# Patient Record
Sex: FEMALE | Race: WHITE | NOT HISPANIC OR LATINO | Employment: UNEMPLOYED | URBAN - METROPOLITAN AREA
[De-identification: names, ages, dates, MRNs, and addresses within clinical notes are randomized per-mention and may not be internally consistent; named-entity substitution may affect disease eponyms.]

---

## 2017-01-09 ENCOUNTER — COMMUNICATION - HEALTHEAST (OUTPATIENT)
Dept: NURSING | Facility: CLINIC | Age: 52
End: 2017-01-09

## 2017-01-16 ENCOUNTER — COMMUNICATION - HEALTHEAST (OUTPATIENT)
Dept: INTERNAL MEDICINE | Facility: CLINIC | Age: 52
End: 2017-01-16

## 2017-02-22 ENCOUNTER — COMMUNICATION - HEALTHEAST (OUTPATIENT)
Dept: NURSING | Facility: CLINIC | Age: 52
End: 2017-02-22

## 2017-03-28 ENCOUNTER — TRANSFERRED RECORDS (OUTPATIENT)
Dept: HEALTH INFORMATION MANAGEMENT | Facility: CLINIC | Age: 52
End: 2017-03-28

## 2017-04-13 LAB
HEP C HIM: NORMAL
TSH SERPL-ACNC: 3.37 UIU/ML (ref 0.45–4.5)

## 2017-04-27 ENCOUNTER — TRANSFERRED RECORDS (OUTPATIENT)
Dept: HEALTH INFORMATION MANAGEMENT | Facility: CLINIC | Age: 52
End: 2017-04-27

## 2017-09-21 ENCOUNTER — TRANSFERRED RECORDS (OUTPATIENT)
Dept: HEALTH INFORMATION MANAGEMENT | Facility: CLINIC | Age: 52
End: 2017-09-21

## 2017-09-21 LAB
CREAT SERPL-MCNC: 0.74 MG/DL (ref 0.57–1)
GFR SERPL CREATININE-BSD FRML MDRD: 92 ML/MIN/1.73M2
GLUCOSE SERPL-MCNC: 96 MG/DL (ref 65–99)
POTASSIUM SERPL-SCNC: 4.1 MMOL/L (ref 3.5–5.2)

## 2017-09-22 ENCOUNTER — COMMUNICATION - HEALTHEAST (OUTPATIENT)
Dept: INTERNAL MEDICINE | Facility: CLINIC | Age: 52
End: 2017-09-22

## 2017-10-18 ENCOUNTER — TRANSFERRED RECORDS (OUTPATIENT)
Dept: HEALTH INFORMATION MANAGEMENT | Facility: CLINIC | Age: 52
End: 2017-10-18

## 2017-10-20 ENCOUNTER — TELEPHONE (OUTPATIENT)
Dept: NEUROLOGY | Facility: CLINIC | Age: 52
End: 2017-10-20

## 2017-10-20 NOTE — TELEPHONE ENCOUNTER
[facility] Call Center    Phone Message    Name of Caller: Deloris    Phone Number: Home number on file [phone] (home) or Cell number on file:    Telephone Information:   Mobile [phone]       Best time to return call: Any    May a detailed message be left on voicemail: yes    Relation to patient: Self    Reason for Call: Question regarding specialist protocol  NOTE  not for sooner than next available appointment, please delete bracketed reminder.  Please follow protocols- only utilize this documentation for questions or concerns that are not clear in the protocol.  Contact clinic directly to clarify question(s) via phone or [employer] message.   Was Clinic Available: no  Question regarding protocol:  Neuropathy   Is there a referral for the requested specialist/specialty? Yes records coming- [facility]   Name of referring provider: Dr. Jackson   Location of referring provider: [facility]         Action Taken: Message routed to:  Adult Clinics: Neurology p [id number]

## 2017-10-23 ENCOUNTER — MEDICAL CORRESPONDENCE (OUTPATIENT)
Dept: HEALTH INFORMATION MANAGEMENT | Facility: CLINIC | Age: 52
End: 2017-10-23

## 2017-10-24 NOTE — TELEPHONE ENCOUNTER
Pt was called and she states she has no questions. Appt scheduled with Dr. Jackson in Dec and pt having her records faxed.    Darla Severin-Brown, LPN

## 2017-11-06 ENCOUNTER — TELEPHONE (OUTPATIENT)
Dept: NEUROLOGY | Facility: CLINIC | Age: 52
End: 2017-11-06

## 2017-11-06 NOTE — TELEPHONE ENCOUNTER
Received a note from Dr. Jackson requesting additional info on pt as well as ref reason.    Spoke with pt this morning to get clarification on records we received form [facility]. There was no ref. attached to those records. Pt indicated that her primary would be sending the ref. From [facility]. Care Everywhere shows ref placed on 10/23/17 for   Neuropathic pain - Primary   Neuralgia, neuritis, and radiculitis, unspecified       Pt meet Dr. Jackson at when he was speaking at the [place] [employer]. Pt stated Dr. Jackson went around to each table and was talking with people and she and her  had discussed with him her current sx. Pt reported that Dr. Jackson discussed with her getting tested possibly CMT and could possibly get some ami for genetic testing as well as additional information on Muscular Dystrophy. Since then the pt has been wanting to see Dr. Jackson but was unable due to insurance issues but at this point pt believes this should not be a problem. IF Dr. Jackson thinks pt should see another physician pt is ok with this as well.     pt is currently schedule to see Dr. Jackson on 12/19/17. Will send to provider to see if pt needs to be rescheduled.  Gabriela Chilel, CMA

## 2017-11-06 NOTE — TELEPHONE ENCOUNTER
Will notify pt ok to keep appt. Still need to get images pushed over but we should have all records we are needing.  Gabriela Chilel, CMA

## 2017-11-07 ENCOUNTER — TRANSFERRED RECORDS (OUTPATIENT)
Dept: HEALTH INFORMATION MANAGEMENT | Facility: CLINIC | Age: 52
End: 2017-11-07

## 2017-11-08 NOTE — TELEPHONE ENCOUNTER
Spoke with pt today and she was notified to keep her upcoming appt and she will be having imaging sent to  sallie Jin or Gabriela.  Gabriela Chilel CMA

## 2017-12-14 ENCOUNTER — PRE VISIT (OUTPATIENT)
Dept: NEUROLOGY | Facility: CLINIC | Age: 52
End: 2017-12-14

## 2017-12-14 NOTE — TELEPHONE ENCOUNTER
Imaging not requested from provider at this time. Will close enc and request imaging only if provider requests.  Gabriela Chilel CMA      LM for [facility] to see if they can push imaging over since we never rcvd a CD    NEEDING TO VERIFY IF PT WAS SEEN ANYWHERE ELSE AND IF WE RCVD IMAGES      [facility] CLINICAL DOCUMENTATION    Pre-Visit Planning   PREVISIT INFORMATION                                                    Deloris Chance scheduled for future visit at [facility] specialty clinics.    Patient is scheduled to see dr gill (provider) on 12/19/17 (date)  Reason for visit: bilateral neuropathy, feet and hands. Weakness in hands and feet. Hammertoes and high arches.   Referring provider: Dr menchaca, Dr Gore at [facility].  Has patient seen previous specialist?   Medical Records:  [facility]    REVIEW                                                      New patient packet mailed to patient: Yes   Medication reconciliation complete:Yes       No current outpatient prescriptions on file.       Allergies: Review of patient's allergies indicates not on file.    (insert provider dot-phrase for provider specific visit requirements)    PLAN/FOLLOW-UP NEEDED                                                      The following is needed before upcoming appointment. Complete    Patient Reminders Given:  Please, make sure you bring an updated list of your medications.   If you are having a procedure, please, present 15 minutes early.  If you need to cancel or reschedule,please call [phone].    Gabriela Chilel    Previsit completed.  Leti Humphreys RN

## 2017-12-18 RX ORDER — OMEPRAZOLE 40 MG/1
40 CAPSULE, DELAYED RELEASE ORAL DAILY
COMMUNITY

## 2017-12-18 RX ORDER — DULOXETIN HYDROCHLORIDE 60 MG/1
60 CAPSULE, DELAYED RELEASE ORAL DAILY
COMMUNITY

## 2017-12-18 RX ORDER — PREGABALIN 75 MG/1
75 CAPSULE ORAL 2 TIMES DAILY
COMMUNITY
End: 2022-08-23

## 2017-12-18 RX ORDER — TRIAMTERENE/HYDROCHLOROTHIAZID 37.5-25 MG
1 TABLET ORAL DAILY
COMMUNITY

## 2017-12-19 ENCOUNTER — OFFICE VISIT (OUTPATIENT)
Dept: NEUROLOGY | Facility: CLINIC | Age: 52
End: 2017-12-19
Payer: COMMERCIAL

## 2017-12-19 VITALS
HEART RATE: 83 BPM | HEIGHT: 67 IN | BODY MASS INDEX: 45.99 KG/M2 | SYSTOLIC BLOOD PRESSURE: 129 MMHG | TEMPERATURE: 98.3 F | DIASTOLIC BLOOD PRESSURE: 86 MMHG | OXYGEN SATURATION: 96 % | WEIGHT: 293 LBS

## 2017-12-19 DIAGNOSIS — R20.9 DISTURBANCE OF SKIN SENSATION: Primary | ICD-10-CM

## 2017-12-19 PROCEDURE — 99244 OFF/OP CNSLTJ NEW/EST MOD 40: CPT | Performed by: PSYCHIATRY & NEUROLOGY

## 2017-12-19 RX ORDER — GABAPENTIN 600 MG/1
600 TABLET ORAL 3 TIMES DAILY
COMMUNITY
Start: 2017-09-25 | End: 2022-01-18

## 2017-12-19 NOTE — LETTER
12/19/2017       RE: Deloris Chance  [address] [address]     Dear Colleague,    Thank you for referring your patient, Deloris Chance, to the [facility] at [facility]. Please see a copy of my visit note below.    No notes on file    Again, thank you for allowing me to participate in the care of your patient.      Sincerely,    Stanislaw Jackson MD

## 2017-12-19 NOTE — MR AVS SNAPSHOT
After Visit Summary   12/19/2017    Deloris Chance    MRN: [id number]           Patient Information     Date Of Birth          1965        Visit Information        Provider Department      12/19/2017 8:30 AM Stanislaw Jackson MD [facility]        Care Instructions      Discharge Instructions for Nerve/Muscle Biopsy  You had a nerve/muscle biopsy, a procedure used to identify a disease or to check the condition of your nerves, muscle, or both. The following instructions will help you care for your incision after the procedure.  Home care  Do's and don'ts include:     Remove the gauze dressing covering your incision after 24 hours.    Leave the incision covered with adhesive strips until you return to have your stitches removed.    Replace any adhesive strips that fall off.    Keep the incision dry. Take a sponge bath to avoid getting your incision wet, unless your healthcare provider tells you otherwise.    Ask your provider when can you take a shower or bathe.    Ask your provider about the best way to keep your incision dry when bathing or showering.    Don t be alarmed if you have a small rim of redness and swelling around the incision. This usually disappears within a few days.    Don t take aspirin. If you have pain, take acetaminophen or another non-aspirin pain reliever every 4 to 6 hours as needed.  Follow-up    Make a follow-up appointment.    Make an appointment for 10 to 14 days after the biopsy to have your stitches removed.  When to call your healthcare provider  Call your healthcare provider right away if you have any of the following:    Increased redness around the biopsy site    Excessive swelling, discomfort, or pain    Drainage or pus from the biopsy site    Bleeding from the incision (not controlled by 10 to 15 minutes of firm pressure on the wound)    Fever of 100.4 F (38.0 C) or higher, or chills         Date Last Reviewed: 11/8/2015 2000-2017 [employer]. [address] [place] [address]. All rights reserved. This information is not intended as a substitute for professional medical care. Always follow your healthcare professional's instructions.                Follow-ups after your visit        Your next 10 appointments already scheduled     2018  9:30 AM CST   PROCEDURE with Stanislaw Jackson MD   [facility] ([facility])    [address] [place] [address] [phone]              Who to contact     If you have questions or need follow up information about today's clinic visit or your schedule please contact [facility] directly at [phone].  Normal or non-critical lab and imaging results will be communicated to you by MyChart, letter or phone within 4 business days after the clinic has received the results. If you do not hear from us within 7 days, please contact the clinic through [employer] or phone. If you have a critical or abnormal lab result, we will notify you by phone as soon as possible.  Submit refill requests through [employer] or call your pharmacy and they will forward the refill request to us. Please allow 3 business days for your refill to be completed.          Additional Information About Your Visit        [employer] Information     [employer] is an electronic gateway that provides easy, online access to your medical records. With [employer], you can request a clinic appointment, read your test results, renew a prescription or communicate with your care team.     To sign up for [employer] visit the website at [email or web address]   You will be asked to enter the access code listed below, as well as some personal information. Please follow the directions to create your username and password.     Your access code is: [email or web address]  Expires: 3/19/2018  9:16 AM     Your access code will  in 90 days. If you need help or a new code, please contact your [facility] or call [phone] for assistance.        Care EveryWhere ID     This is your Care EveryWhere ID. This could be used by other organizations to access your [place] medical records  [id number]        Your Vitals Were     Pulse Temperature Height Pulse Oximetry BMI (Body Mass Index)       83 98.3  F (36.8  C) (Oral) 1.702 m (5' 7\") 96% 49.02 kg/m2        Blood Pressure from Last 3 Encounters:   12/19/17 129/86    Weight from Last 3 Encounters:   12/19/17 (!) 142 kg (313 lb)              Today, you had the following     No orders found for display       Primary Care Provider Office Phone # Fax #    Tanisha Velarde, AGUSTÍN [phone]       [facility] [address] [address]        Equal Access to Services     TEJAS COKER : Hadii aad ku hadasho Soomaali, waaxda luqadaha, qaybta kaalmada adeegyada, shantal harrisonin hayadis weiner . So [facility] [phone].    ATENCIÓN: Si habla español, tiene a amos disposición servicios gratuitos de asistencia lingüística. Lukas al [phone].    We comply with applicable federal civil rights laws and [place] laws. We do not discriminate on the basis of race, color, national origin, age, disability, sex, sexual orientation, or gender identity.            Thank you!     Thank you for choosing [facility]  for your care. Our goal is always to provide you with excellent care. Hearing back from our patients is one way we can continue to improve our services. Please take a few minutes to complete the written survey that you may receive in the mail after your visit with us. Thank you!             Your Updated Medication List - Protect others around you: Learn how to safely use, store and throw away your medicines at www.disposemymeds.org.          This list is accurate as of: 12/19/17  9:16 AM.  Always use your most recent med list.                   Brand Name Dispense Instructions for use " Diagnosis    ALPHA LIPOIC ACID PO      Take 300 mg by mouth 2 times daily        DULoxetine 60 MG EC capsule    CYMBALTA     Take 60 mg by mouth daily        gabapentin 600 MG tablet    NEURONTIN          LYRICA 75 MG capsule   Generic drug:  pregabalin      Take 75 mg by mouth 2 times daily        MAGNESIUM OXIDE PO      Take 400 mg by mouth daily        omeprazole 40 MG capsule    priLOSEC     Take 40 mg by mouth daily        PROBIOTIC & ACIDOPHILUS EX ST PO      Take 1 capsule by mouth daily        triamterene-hydrochlorothiazide 37.5-25 MG per tablet    MAXZIDE-25     Take 1 tablet by mouth daily        VITAMIN D (CHOLECALCIFEROL) PO      Take 50,000 Units by mouth once a week

## 2017-12-19 NOTE — PATIENT INSTRUCTIONS
Discharge Instructions for Nerve/Muscle Biopsy  You had a nerve/muscle biopsy, a procedure used to identify a disease or to check the condition of your nerves, muscle, or both. The following instructions will help you care for your incision after the procedure.  Home care  Do's and don'ts include:     Remove the gauze dressing covering your incision after 24 hours.    Leave the incision covered with adhesive strips until you return to have your stitches removed.    Replace any adhesive strips that fall off.    Keep the incision dry. Take a sponge bath to avoid getting your incision wet, unless your healthcare provider tells you otherwise.    Ask your provider when can you take a shower or bathe.    Ask your provider about the best way to keep your incision dry when bathing or showering.    Don t be alarmed if you have a small rim of redness and swelling around the incision. This usually disappears within a few days.    Don t take aspirin. If you have pain, take acetaminophen or another non-aspirin pain reliever every 4 to 6 hours as needed.  Follow-up    Make a follow-up appointment.    Make an appointment for 10 to 14 days after the biopsy to have your stitches removed.  When to call your healthcare provider  Call your healthcare provider right away if you have any of the following:    Increased redness around the biopsy site    Excessive swelling, discomfort, or pain    Drainage or pus from the biopsy site    Bleeding from the incision (not controlled by 10 to 15 minutes of firm pressure on the wound)    Fever of 100.4 F (38.0 C) or higher, or chills         Date Last Reviewed: 11/8/2015 2000-2017 [employer]. [address] [place] [address], [place] [address]. All rights reserved. This information is not intended as a substitute for professional medical care. Always follow your healthcare professional's instructions.

## 2017-12-19 NOTE — PROGRESS NOTES
2017            Marcelo Gore MD   [facility]   [address] [place]  [address]      RE:  Deloris Chance   MRN:  [id number]   :  1965      Dear Dr. Gore:      I saw Deloris Chance in neuromuscular consultation at your request for further evaluation of pain.  Ms. Chance is a 52-year-old woman who developed paresthesias, subjective sensory loss and aching discomfort beginning in the feet in 2011.  She denies any definite precipitating circumstances except for possibly use of Levaquin.  Symptoms have progressed since then to involve the hands and feet.  She has also had difficulty with balance, which was attributed to compensation for leg discomfort.  She has had physical therapy for this.  She also has polyarticular pain which has been attributed to bursitis.  She has been treated for carpal tunnel syndrome with bilateral decompressions.  She has been seen in Rheumatology consultation and was initially thought to have Sjogren syndrome; however, followup evaluation in  reversed that diagnosis based upon a paucity of sicca and absence of serologic confirmation.  The patient presents for further assessment of her symptoms of possible small fiber neuropathy.      Ms. Chance's full past medical history, social history, allergy list, medication list, family history, occupational history and system review are documented in the electronic medical record.  In addition to the aforementioned, she is scheduled for a Pain Medicine consultation at [facility].  There is no family history of similar symptoms.  Her alcohol use is not documented in the electronic medical record.  She denies a family history of neuromuscular symptoms.  She does not use alcohol.      PHYSICAL EXAMINATION:   GENERAL:  The patient is an overweight, but otherwise healthy-appearing woman.   VITAL SIGNS:  Normal.   GENERAL:  Demonstrates no stigmata of vasculopathy or osteoarthritis.    NEUROLOGIC:  Demonstrates the following -- The patient is alert and cooperative.  Speech, language and affect are normal.  Cranial nerves II-XII are normal and there is no pathologic nystagmus.  Perception of light touch is preserved.  Vibration scores are 2 at the malleoli and 0 at the toes.  Pinprick perception is reduced distal to the proximal calves.  Position sense is preserved.  Motor examination demonstrates normal tone, bulk, strength and rapid repetitive movements except for the left hand, where there is moderately severe atrophy of the thenar eminence and 4/5 strength of thumb abduction.  There is no postural or resting tremor.  Rapid repetitive movements are performed normally in the hands.  Reflexes are absent at the jaw, 2+ in the upper and lower limbs with spread to finger flexors from brachioradialis, with no clonus and with mute plantar responses.  There is no dysmetria in upper or lower limbs.  She walks independently with an acceptable base.  Romberg sign is not present.  Tandem gait is acceptable for a person of her habitus.      Extensive appropriate laboratory studies including a glucose tolerance test are negative.  Nerve conduction studies demonstrate normal sural sensory nerve action potential amplitudes and normal motor conduction studies.  Superficial peroneal responses were reportedly absent.      Ms. Chance has subjective sensory loss in the feet and hands and mild multimodal sensory loss in the feet with normal sural conductions and abnormal superficial peroneal conductions.  Whether this represents a length-dependent neuropathy or an incidental finding is unclear.  While Dr. Andres correctly opined that documentation of small fiber neuropathy would not , I think in the context of a presumed diagnosis it is appropriate to clarify and have therefore suggested skin biopsies of the foot and calf.  The calf is explicitly included because of the absence of superficial  peroneal sensory responses which could be due to mechanical causes as noted in the EMG report and the presence of subjective pinprick deficits as far proximally as the proximal calves.      Appropriate investigations have been done for medical etiologies of small fiber neuropathy if indeed it is demonstrated to be present.  Management has been appropriate as well and I have no further recommendations in that regard.         Sincerely,      ANSLEY ALFONSO MD             D: 2017 09:23   T: 2017 12:18   MT: TS      Name:     ESPERANZA SINGH   MRN:      [id number]        Account:      [id number]   :      1965      Document: [id number]       cc: Janet Gore MD

## 2017-12-19 NOTE — NURSING NOTE
"Deloris Chance's goals for this visit include: consult  She requests these members of her care team be copied on today's visit information:     PCP: Tanisha Velarde    Referring Provider:  Tanisha Velarde NP  [facility]  [address] [place] [address]    Chief Complaint   Patient presents with     Consult     bilateral neuropathy       Initial /86  Pulse 83  Temp 98.3  F (36.8  C) (Oral)  Ht 1.702 m (5' 7\")  Wt (!) 142 kg (313 lb)  SpO2 96%  BMI 49.02 kg/m2 Estimated body mass index is 49.02 kg/(m^2) as calculated from the following:    Height as of this encounter: 1.702 m (5' 7\").    Weight as of this encounter: 142 kg (313 lb).  Medication Reconciliation: complete    "

## 2017-12-19 NOTE — LETTER
2017        RE: Deloris Chance  [address] [address]        [id number]    2017            Marcelo Gore MD   [facility]   [address] [place]  [address]      RE:  Deloris Chance   MRN:  [id number]   :  1965      Dear Dr. Gore:      I saw Deloris Chance in neuromuscular consultation at your request for further evaluation of pain.  Ms. Chance is a 52-year-old woman who developed paresthesias, subjective sensory loss and aching discomfort beginning in the feet in 2011.  She denies any definite precipitating circumstances except for possibly use of Levaquin.  Symptoms have progressed since then to involve the hands and feet.  She has also had difficulty with balance, which was attributed to compensation for leg discomfort.  She has had physical therapy for this.  She also has polyarticular pain which has been attributed to bursitis.  She has been treated for carpal tunnel syndrome with bilateral decompressions.  She has been seen in Rheumatology consultation and was initially thought to have Sjogren syndrome; however, followup evaluation in  reversed that diagnosis based upon a paucity of sicca and absence of serologic confirmation.  The patient presents for further assessment of her symptoms of possible small fiber neuropathy.      Ms. Chance's full past medical history, social history, allergy list, medication list, family history, occupational history and system review are documented in the electronic medical record.  In addition to the aforementioned, she is scheduled for a Pain Medicine consultation at [facility].  There is no family history of similar symptoms.  Her alcohol use is not documented in the electronic medical record.  She denies a family history of neuromuscular symptoms.  She does not use alcohol.      PHYSICAL EXAMINATION:   GENERAL:  The patient is an overweight, but otherwise healthy-appearing woman.    VITAL SIGNS:  Normal.   GENERAL:  Demonstrates no stigmata of vasculopathy or osteoarthritis.   NEUROLOGIC:  Demonstrates the following -- The patient is alert and cooperative.  Speech, language and affect are normal.  Cranial nerves II-XII are normal and there is no pathologic nystagmus.  Perception of light touch is preserved.  Vibration scores are 2 at the malleoli and 0 at the toes.  Pinprick perception is reduced distal to the proximal calves.  Position sense is preserved.  Motor examination demonstrates normal tone, bulk, strength and rapid repetitive movements except for the left hand, where there is moderately severe atrophy of the thenar eminence and 4/5 strength of thumb abduction.  There is no postural or resting tremor.  Rapid repetitive movements are performed normally in the hands.  Reflexes are absent at the jaw, 2+ in the upper and lower limbs with spread to finger flexors from brachioradialis, with no clonus and with mute plantar responses.  There is no dysmetria in upper or lower limbs.  She walks independently with an acceptable base.  Romberg sign is not present.  Tandem gait is acceptable for a person of her habitus.      Extensive appropriate laboratory studies including a glucose tolerance test are negative.  Nerve conduction studies demonstrate normal sural sensory nerve action potential amplitudes and normal motor conduction studies.  Superficial peroneal responses were reportedly absent.      Ms. Chance has subjective sensory loss in the feet and hands and mild multimodal sensory loss in the feet with normal sural conductions and abnormal superficial peroneal conductions.  Whether this represents a length-dependent neuropathy or an incidental finding is unclear.  While Dr. Andres correctly opined that documentation of small fiber neuropathy would not , I think in the context of a presumed diagnosis it is appropriate to clarify and have therefore suggested skin biopsies of  the foot and calf.  The calf is explicitly included because of the absence of superficial peroneal sensory responses which could be due to mechanical causes as noted in the EMG report and the presence of subjective pinprick deficits as far proximally as the proximal calves.      Appropriate investigations have been done for medical etiologies of small fiber neuropathy if indeed it is demonstrated to be present.  Management has been appropriate as well and I have no further recommendations in that regard.         Sincerely,      STANISLAW ALFONSO MD             D: 2017 09:23   T: 2017 12:18   MT: TS      Name:     ESPERANZA SINGH   MRN:      [id number]        Account:      [id number]   :      1965      Document: [id number]       cc: Janet Gore MD         Sincerely,        Stanislaw Alfonso MD

## 2019-05-07 ENCOUNTER — OFFICE VISIT (OUTPATIENT)
Dept: NEUROLOGY | Facility: CLINIC | Age: 54
End: 2019-05-07
Payer: COMMERCIAL

## 2019-05-07 VITALS
OXYGEN SATURATION: 99 % | DIASTOLIC BLOOD PRESSURE: 92 MMHG | WEIGHT: 293 LBS | HEART RATE: 79 BPM | BODY MASS INDEX: 51.48 KG/M2 | SYSTOLIC BLOOD PRESSURE: 152 MMHG

## 2019-05-07 DIAGNOSIS — M62.81 GENERALIZED MUSCLE WEAKNESS: Primary | ICD-10-CM

## 2019-05-07 DIAGNOSIS — E66.01 MORBID OBESITY (H): ICD-10-CM

## 2019-05-07 LAB
CK SERPL-CCNC: 88 U/L (ref 30–225)
CRP SERPL-MCNC: 12.1 MG/L (ref 0–8)
ERYTHROCYTE [SEDIMENTATION RATE] IN BLOOD BY WESTERGREN METHOD: 17 MM/H (ref 0–30)

## 2019-05-07 PROCEDURE — 86255 FLUORESCENT ANTIBODY SCREEN: CPT | Mod: 90 | Performed by: PSYCHIATRY & NEUROLOGY

## 2019-05-07 PROCEDURE — 83516 IMMUNOASSAY NONANTIBODY: CPT | Mod: 90 | Performed by: PSYCHIATRY & NEUROLOGY

## 2019-05-07 PROCEDURE — 36415 COLL VENOUS BLD VENIPUNCTURE: CPT | Performed by: PSYCHIATRY & NEUROLOGY

## 2019-05-07 PROCEDURE — 85652 RBC SED RATE AUTOMATED: CPT | Performed by: PSYCHIATRY & NEUROLOGY

## 2019-05-07 PROCEDURE — 83519 RIA NONANTIBODY: CPT | Mod: 90 | Performed by: PSYCHIATRY & NEUROLOGY

## 2019-05-07 PROCEDURE — 99213 OFFICE O/P EST LOW 20 MIN: CPT | Performed by: PSYCHIATRY & NEUROLOGY

## 2019-05-07 PROCEDURE — 82550 ASSAY OF CK (CPK): CPT | Performed by: PSYCHIATRY & NEUROLOGY

## 2019-05-07 PROCEDURE — 86140 C-REACTIVE PROTEIN: CPT | Performed by: PSYCHIATRY & NEUROLOGY

## 2019-05-07 PROCEDURE — 99000 SPECIMEN HANDLING OFFICE-LAB: CPT | Performed by: PSYCHIATRY & NEUROLOGY

## 2019-05-07 PROCEDURE — 86235 NUCLEAR ANTIGEN ANTIBODY: CPT | Mod: 90 | Performed by: PSYCHIATRY & NEUROLOGY

## 2019-05-07 RX ORDER — TOPIRAMATE 25 MG/1
1 TABLET, FILM COATED ORAL EVERY MORNING
COMMUNITY
Start: 2015-09-08 | End: 2020-06-16

## 2019-05-07 ASSESSMENT — PAIN SCALES - GENERAL: PAINLEVEL: MODERATE PAIN (5)

## 2019-05-07 NOTE — NURSING NOTE
Deloris Chance's goals for this visit include: return  She requests these members of her care team be copied on today's visit information:     PCP: Tanisha Velarde    Referring Provider:  Tanisha Velarde NP  [facility]  [address] [place] [address]    BP (!) 152/92   Pulse 79   Wt 149.1 kg (328 lb 11.2 oz)   SpO2 99%   BMI 51.48 kg/m      Do you need any medication refills at today's visit? ?

## 2019-05-07 NOTE — LETTER
5/7/2019         RE: Deloris Chance  [address] [facility] [address]        Dear Colleague,    Thank you for referring your patient, Deloris Chance, to the [facility]. Please see a copy of my visit note below.    Followup visit for 54 year old woman who I have previously seen for consideration of possible sensory neuropathy. She presents now because of symptoms of weakness and soreness in past few years. Symptoms in arms and lower limbs. Some falls at home. No true pigmenturia. History of neck injury. Soreness most prominent in triceps and thighs. Occasional scalp soreness but not currently. Occasional localized rash in past but not currently. Mother treated for myasthenia gravis in her 70s. She denies dyspnea, dysphagia, dysarthria, diplopia, or ptosis.     No STA tenderness. FROM c-spine. No rash.    Mental state: Alert, appropriate, speech, language, and thought content normal.     Cranial nerves II-XII normal. EOMI without ptosis or diplopia.    Sensory examination:   Right Left   Light touch Normal Normal   Vibration (timed)     Vibration (Rydell-Seiffer) MM3 MM3   Pin Broadly reduced Broadly reduced   Position     Legend:   MM = medial malleolus, TT = tibial tuberosity, K = patella, MCP = MCP joint  MF = mid-foot, DC = distal calf, MC = mid calf, PC = proximal calf      Manual muscle testing (A indicates atrophy):   Right Left   Shoulder abduction  5 5   Elbow extension 5 5   Elbow flexion 5 5   Wrist extension  5 5   Finger extension 5 5   FDI 5 5   APB 5 5   Hip flexion 5 5   Knee flexion 5 5   Knee extension 5 5   Dorsiflexion 5 5   Plantar flexion 5 5   FDP 2: 5 bilaterally     Muscle tone:   Right Left   Upper limb Normal Normal   Lower limb Normal Normal        Reflexes:   Right Left   Triceps 2 2   Biceps 2 2   Brachioradialis 2 spread 2 spread   Patellar 2 2   Achilles 2 2   Plantar Flexor Flexor   Clonus Absent Absent   Jaw  absent.      Coordination:  Finger-nose normal.  Heel-shin normal.  RRMs normal.    Gait:  Antalgic, independent. Does have difficulty rising from a seated position without using arms.     Impression:  New constellation of symptoms.    Recommendations:  EDx  CK, AchR Ab  TSH done (negative)  ESR, CRP, myositis panel      Again, thank you for allowing me to participate in the care of your patient.        Sincerely,        Stanislaw Jackson MD

## 2019-05-07 NOTE — LETTER
Ms.Catherine PATSY Chance  [address] [facility] [address]        May 21, 2019    Dear ,    We are writing to inform you of your test results.    Resulted Orders   Polymyositis and Dermatomyositis Panel   Result Value Ref Range    Myositis Interp SEE NOTE       Comment:      (Note)  INTERPRETIVE INFORMATION: Polymyositis and Dermatomyositis                            Panel  If present, myositis-specific antibodies (MSA) are specific   for myositis, and may be useful in establishing diagnosis   as well as prognosis. MSAs are generally regarded as   mutually exclusive with rare exceptions; the occurrence of   two or more MSAs should be carefully evaluated in the   context of patient's clinical presentation.   Myositis-associated antibodies (MAA) may be found in   patients with CTD, including overlap syndromes, and are   generally not specific for myositis. The following table   will help in identifying the association of any antibodies   found as either MSAs or Rahat.   Antibody Specificity . . . . . . . . . . . . MSA . . . . MAA  Fredo-1 (histidyl-tRNA synthetase) Ab, IgG  . .  X  PL-12 (alanyl-tRNA synthetase) Antibody  . .  X  PL-7 (threonyl-tRNA synthetase) Antibody . .  X  EJ (glycyl-tRNA synthetase) Antibody . . . .  X  OJ (isoleucyl-tRNA synthetase) Antib  shannan  . .  X  SRP (Signal Recognition Particle) Ab . . . .  X  Mi-2 (nuclear helicase protein) Antibody . .  X  P155/140 Antibody  . . . . . . . . . . . . .  X  TIF-1 gamma (155 kDA) Antibody  . . . . .  .  X  SAE1 (SUMO activating enzyme) Antibody . . .  X  MDA5 (CADM-140) Antibody . . . . . . . . . .  X  NXP-2 (Nuclear matrix proten-2) Ab . . . . .  X  Test developed and characteristics determined by [facility]. See Compliance Statement D: [facility]      FREDO-1 (Histidyl-tRNA Synthetase) Mackenzie IgG 0 0 - 40 AU/mL      Comment:      (Note)  INTERPRETIVE INFORMATION:  Fredo-1 Antibody, IgG   29 AU/mL or less.........Negative   30-40  AU/mL..............Equivocal   41 AU/mL or greater......Positive      PL-7 (threonyl-tRNA synthetase) Antibody Negative Negative    PL-12 (alanyl-tRNA synthetase) Antibody Negative Negative    EJ (glycyl - tRNA synthetase) Antibody Negative Negative    SRP (Signal Recognition Particle) Antibody Negative Negative    OJ (isoleucyl-tRNA synthetase) Antibody Negative Negative    SAE1 (SUMO activating enzyme) Mackenzie Negative       Comment:      (Note)  SAE1 antibody negative by line immunoassay. No bands   corresponding to 90 and 40 Kda observed by   immunoprecipitation.      NXP-2 (Nuclear matrix protein 2) Mackenzie Negative       Comment:      (Note)  NXP-2 antibody negative by line immunoassay. No band   corresponding to 140 KDa observed by immunoprecipitation.      MDA5 (CADM 140) Mackenzie Negative       Comment:      (Note)  MDA-5 antibody negative by line immunoassay. No band   corresponding to 140 KDa observed by immunoprecipitation.      TIF-1 Gamma Antibody Negative       Comment:      (Note)  TIF-1 gamma antibody negative by line immunoassay. No band   corresponding to 155 KDa observed by immunoprecipitation.      Mi-2 (nuclear helicase protein) Antibody Negative Negative    P155/140 (TIF1-gamma) Antibody Negative Negative      Comment:      (Note)  Performed by [facility],  [address] [place] [id number] [phone]  [email or web address], Orville Ring MD, Lab. Director     Erythrocyte sedimentation rate auto   Result Value Ref Range    Sed Rate 17 0 - 30 mm/h   CRP inflammation   Result Value Ref Range    CRP Inflammation 12.1 (H) 0.0 - 8.0 mg/L   CK total   Result Value Ref Range    CK Total 88 30 - 225 U/L   Acetylcholine receptor binding   Result Value Ref Range    AcetChol Binding Mackenzie 0.0 0.0 - 0.4 nmol/L      Comment:      (Note)  INTERPRETIVE INFORMATION: Acetylcholine Binding Ab   Negative ....... 0.0 - 0.4 nmol/L   Positive ....... 0.5 nmol/L or greater  Approximately 85-90 percent of patients with myasthenia    gravis (MG) express antibodies to the acetylcholine   receptor (AChR), which can be divided into binding,   blocking, and modulating antibodies. Binding antibody can   activate complement and lead to loss of AChR. Blocking   antibody may impair binding of acetylcholine to the   receptor, leading to poor muscle contraction. Modulating   antibody causes receptor endocytosis resulting in loss of   AChR expression, which correlates most closely with   clinical severity of disease. Approximately 10-15 percent   of individuals with confirmed myasthenia gravis have no   measurable binding, blocking, or modulating antibodies.  Test developed and characteristics determined by [facility]. See Compliance Statement B: [employer]  Performed by [facility],  [address] [place] [address] [phone]  [email or web address], Orville Ring MD, Lab. Director     Acetylcholine receptor blocking donna   Result Value Ref Range    AcetChol Block Donna 16 0 - 26 %      Comment:      (Note)  INTERPRETIVE INFORMATION: Acetylcholine Blocking Ab   Negative ............  0-26 percent blocking   Indeterminate ....... 27-41 percent blocking   Positive ............ 42 percent or greater blocking  Approximately 85-90 percent of patients with myasthenia   gravis (MG) express antibodies to the acetylcholine   receptor (AChR), which can be divided into binding,   blocking, and modulating antibodies. Binding antibody can   activate complement and lead to loss of AChR. Blocking   antibody may impair binding of acetylcholine to the   receptor, leading to poor muscle contraction. Modulating   antibody causes receptor endocytosis resulting in loss of   AChR expression, which correlates most closely with   clinical severity of disease. Approximately 10-15 percent   of individuals with confirmed myasthenia gravis have no   measurable binding, blocking, or modulating antibodies.  Test developed and characteristics determined by [facility]. See Compliance Statement B: [employer]  Performed by [facility],  [address] [place] [address] [phone]  www.[email or web address], Orville Ring MD, Lab. Director     Acetylcholine modulating antibody   Result Value Ref Range    AcetChol Modul Mackenzie 0 <=45 %      Comment:      (Note)  INTERPRETIVE INFORMATION: Acetylcholine Modulating Ab   Negative ..........  0-45 percent modulating   Positive ..........  46 percent or greater modulating  Approximately 85-90 percent of patients with myasthenia   gravis (MG) express antibodies to the acetylcholine   receptor (AChR), which can be divided into binding,   blocking, and modulating antibodies. Binding antibody can   activate complement and lead to loss of AChR. Blocking   antibody may impair binding of acetylcholine to the   receptor, leading to poor muscle contraction. Modulating   antibody causes receptor endocytosis resulting in loss of   AChR expression, which correlates most closely with   clinical severity of disease. Approximately 10-15 percent   of individuals with confirmed myasthenia gravis have no   measurable binding, blocking, or modulating antibodies.  Test developed and characteristics determined by [facility]. See Compliance Statement B: [employer]  Performed by [facility],  [address] [place] [address] [phone]  www.[email or web address], Orville Ring MD, Lab. Director     Striated muscle antibody IgG   Result Value Ref Range    Striated Muscle Antibody IgG <1:40 <1:40      Comment:      (Note)  Striated Muscle Antibodies, IgG are not detected. No   further testing will be performed.  INTERPRETIVE DATA:  Striated Muscle Antibodies, IgG Screen  In the presence of acetylcholine receptor (AChR) antibody,   striated muscle antibodies, which bind in a   cross-striational pattern to skeletal and heart muscle   tissue sections, are associated with late-onset myasthenia   gravis (MG). Striated muscle antibodies  recognize epitopes   on three major muscle proteins, including: titin, ryanodine   receptor (RyR) and Kv1.4 (an alpha subunit of voltage-gated   potassium channel [VGKC]). Isolated cases of striated   muscle antibodies may be seen in patients with certain   autoimmune diseases, rheumatic fever, myocardial   infarction, and following some cardiotomy procedures.  Test developed and characteristics determined by [facility] [facility]. See Compliance Statement A: [employer]/  Performed by [facility],  [address] [place] [address] [phone]  www.[email or web address], Adela Ring MD, Lab. Director         Your test results fall within the expected range(s) or remain unchanged from previous results.  Please continue with current treatment plan.    Stanislaw Jackson MD  da

## 2019-05-07 NOTE — PROGRESS NOTES
Followup visit for 54 year old woman who I have previously seen for consideration of possible sensory neuropathy. She presents now because of symptoms of weakness and soreness in past few years. Symptoms in arms and lower limbs. Some falls at home. No true pigmenturia. History of neck injury. Soreness most prominent in triceps and thighs. Occasional scalp soreness but not currently. Occasional localized rash in past but not currently. Mother treated for myasthenia gravis in her 70s. She denies dyspnea, dysphagia, dysarthria, diplopia, or ptosis.     No STA tenderness. FROM c-spine. No rash.    Mental state: Alert, appropriate, speech, language, and thought content normal.     Cranial nerves II-XII normal. EOMI without ptosis or diplopia.    Sensory examination:   Right Left   Light touch Normal Normal   Vibration (timed)     Vibration (Rydell-Seiffer) MM3 MM3   Pin Broadly reduced Broadly reduced   Position     Legend:   MM = medial malleolus, TT = tibial tuberosity, K = patella, MCP = MCP joint  MF = mid-foot, DC = distal calf, MC = mid calf, PC = proximal calf      Manual muscle testing (A indicates atrophy):   Right Left   Shoulder abduction  5 5   Elbow extension 5 5   Elbow flexion 5 5   Wrist extension  5 5   Finger extension 5 5   FDI 5 5   APB 5 5   Hip flexion 5 5   Knee flexion 5 5   Knee extension 5 5   Dorsiflexion 5 5   Plantar flexion 5 5   FDP 2: 5 bilaterally     Muscle tone:   Right Left   Upper limb Normal Normal   Lower limb Normal Normal        Reflexes:   Right Left   Triceps 2 2   Biceps 2 2   Brachioradialis 2 spread 2 spread   Patellar 2 2   Achilles 2 2   Plantar Flexor Flexor   Clonus Absent Absent   Jaw absent.      Coordination:  Finger-nose normal.  Heel-shin normal.  RRMs normal.    Gait:  Antalgic, independent. Does have difficulty rising from a seated position without using arms.     Impression:  New constellation of symptoms.    Recommendations:  EDx  CK, AchR Ab  TSH done  (negative)  ESR, CRP, myositis panel

## 2019-05-09 LAB
ACHR BLOCK AB/ACHR TOTAL SFR SER: 16 % (ref 0–26)
STRIA MUS IGG SER QL IF: NORMAL

## 2019-05-10 LAB
ACHR BIND AB SER-SCNC: 0 NMOL/L (ref 0–0.4)
ACHR MOD AB/ACHR TOTAL SFR SER: 0 %

## 2019-05-20 LAB
ANNOTATION COMMENT IMP: NORMAL
EJ AB SER QL: NEGATIVE
ENA JO1 AB TITR SER: 0 AU/ML (ref 0–40)
MDA5 (CADM 140) ABY: NEGATIVE
MI2 AB SER QL: NEGATIVE
NXP-2 (NUCLEAR MATRIX PROTEIN 2) ABY: NEGATIVE
OJ AB SER QL: NEGATIVE
P155/140 (TIF1-GAMMA) ANTIBODY: NEGATIVE
PL12 AB SER QL: NEGATIVE
PL7 AB SER QL: NEGATIVE
SAE1 (SUMO ACTIVATING ENZYME) ABY: NEGATIVE
SRP AB SERPL QL: NEGATIVE
TIF-1 GAMMA ANTIBODY: NEGATIVE

## 2019-05-21 NOTE — RESULT ENCOUNTER NOTE
Results are normal or not clinically significant. Please notify the patient that they are fine and do not require any further action.

## 2019-06-21 ENCOUNTER — OFFICE VISIT (OUTPATIENT)
Dept: NEUROLOGY | Facility: CLINIC | Age: 54
End: 2019-06-21
Attending: PSYCHIATRY & NEUROLOGY
Payer: COMMERCIAL

## 2019-06-21 DIAGNOSIS — M62.81 GENERALIZED MUSCLE WEAKNESS: Primary | ICD-10-CM

## 2019-06-21 DIAGNOSIS — R20.9 DISTURBANCE OF SKIN SENSATION: ICD-10-CM

## 2019-06-21 PROCEDURE — 95885 MUSC TST DONE W/NERV TST LIM: CPT | Performed by: PSYCHIATRY & NEUROLOGY

## 2019-06-21 PROCEDURE — 95910 NRV CNDJ TEST 7-8 STUDIES: CPT | Performed by: PSYCHIATRY & NEUROLOGY

## 2019-06-21 NOTE — LETTER
2019         RE: Deloris Chance  [address] [facility] [address]        Dear Colleague,    Thank you for referring your patient, Deloris Chance, to the [facility]. Please see a copy of my visit note below.    [facility]  Electrodiagnostic Laboratory    Nerve Conduction & EMG Report          Patient: Deloris Chance YOB: 1965  Patient ID: [id number] Age: 54 Years 5 Months  Gender: Female      History & Examination:  Deloris Chance is a 54 year old woman with symptoms of muscle weakness and aching. She was previously evaluated for possible sensory polyneuropathy. Neurologic examination is normal. She is referred for evaluation of possible myopathy or polyneuropathy.    Techniques:  Motor conduction studies were done with surface recording electrodes. Sensory conduction studies were performed with surface electrodes, unless indicated otherwise by (n), designating the use of subdermal recording electrodes. Temperature was monitored and recorded throughout the study. Upper extremities were maintained at a temperature of 32 degrees Centigrade or higher. Lower extremities were maintained at a temperature of 31 degrees Centigrade or higher. EMG was done with a concentric needle electrode.    Results:  Left sural and ulnar sensory nerve action potentials were absent. A left median sensory conduction study was normal. The left radial sensory nerve action potential was mildly attenuated. Left peroneal, tibial, median, and ulnar motor conduction studies were normal. The left median minimum F-response latency was normal. Screening electromyography of left upper and lower limbs was normal.     Interpretation:  This is an abnormal study, demonstrating electrophysiologic evidence of the followin. Sensory polyneuropathy or ganglionopathy.  2. No evidence of myopathy.    Stanislaw Jackson M.D.       Sensory NCS      Nerve / Sites Rec. Site Onset Peak NP  Amp Ref. PP Amp Dist Thiago Ref. Temp     ms ms  V  V  V cm m/s m/s  C   L MEDIAN - Dig II      Dig II Wrist 3.02 3.65 10.6 10.0 10.6 14.5 48.0 48.0 34.1   L ULNAR - Dig V      Dig V Wrist NR NR NR 8.0 NR 12.5 NR 48.0 33.8   L RADIAL - Snuff      Forearm Snuff 1.88 2.66 11.0 15.0 14.8 12.5 66.7 48.0 33.5   L SURAL - Lat Mall      Calf Ankle NR NR NR 8.0 NR 14 NR 38.0 31.1       Motor NCS      Nerve / Sites Rec. Site Lat Ref. Amp Ref. Rel Amp Dist Thiago Ref. Dur. Area Temp.     ms ms mV mV % cm m/s m/s ms %  C   L MEDIAN - APB      Wrist APB 4.01 4.40 6.0 5.0 100 8   4.58 100 33.8      Elbow APB 8.65  5.7  94.2 23 49.6 48.0 4.90 90.2 33.6   L ULNAR - ADM      Wrist ADM 2.97 3.50 8.7 5.0 100 8   5.63 100 33.6      B.Elbow ADM 6.15  7.1  81.3 16.5 51.9 48.0 6.88 87 33.9      A.Elbow ADM 8.02  6.2  71.2 11 58.7 48.0 6.56 93.6 33.5   L DEEP PERONEAL - EDB      Ankle EDB 5.00 6.00 3.2 2.5 100 8   6.51 100 30.7   L TIBIAL - AH      Ankle AH 3.85 6.00 9.3 4.0 100 8   5.36 100 31       F  Wave      Nerve Min F Lat Max F Lat Mean FLat Temp.    ms ms ms  C   L MEDIAN 30.05 33.59 32.44 33.4       EMG Summary Table     Spontaneous MUAP Recruitment    IA Fib/PSW Fasc H.F. Amp Dur. PPP Pattern   L. BICEPS N None None None N N None Normal   L. DELTOID N None None None N N None Normal   L. EXT DIG COMM N None None None N N None Normal   L. TIB ANTERIOR N None None None N N None Normal   L. GASTROCN (MED) N None None None N N None Normal   L. VAST LATERALIS N None None None N N None Normal                              Again, thank you for allowing me to participate in the care of your patient.        Sincerely,        Stanislaw Jackson MD

## 2019-06-24 NOTE — PROGRESS NOTES
[facility]  Electrodiagnostic Laboratory    Nerve Conduction & EMG Report          Patient: Deloris Chance YOB: 1965  Patient ID: [id number] Age: 54 Years 5 Months  Gender: Female      History & Examination:  Deloris Chance is a 54 year old woman with symptoms of muscle weakness and aching. She was previously evaluated for possible sensory polyneuropathy. Neurologic examination is normal. She is referred for evaluation of possible myopathy or polyneuropathy.    Techniques:  Motor conduction studies were done with surface recording electrodes. Sensory conduction studies were performed with surface electrodes, unless indicated otherwise by (n), designating the use of subdermal recording electrodes. Temperature was monitored and recorded throughout the study. Upper extremities were maintained at a temperature of 32 degrees Centigrade or higher. Lower extremities were maintained at a temperature of 31 degrees Centigrade or higher. EMG was done with a concentric needle electrode.    Results:  Left sural and ulnar sensory nerve action potentials were absent. A left median sensory conduction study was normal. The left radial sensory nerve action potential was mildly attenuated. Left peroneal, tibial, median, and ulnar motor conduction studies were normal. The left median minimum F-response latency was normal. Screening electromyography of left upper and lower limbs was normal.     Interpretation:  This is an abnormal study, demonstrating electrophysiologic evidence of the followin. Sensory polyneuropathy or ganglionopathy.  2. No evidence of myopathy.    Stanislaw Jackson M.D.       Sensory NCS      Nerve / Sites Rec. Site Onset Peak NP Amp Ref. PP Amp Dist Thiago Ref. Temp     ms ms  V  V  V cm m/s m/s  C   L MEDIAN - Dig II      Dig II Wrist 3.02 3.65 10.6 10.0 10.6 14.5 48.0 48.0 34.1   L ULNAR - Dig V      Dig V Wrist NR NR NR 8.0 NR 12.5 NR 48.0 33.8   L RADIAL - Snuff      Forearm Snuff  1.88 2.66 11.0 15.0 14.8 12.5 66.7 48.0 33.5   L SURAL - Lat Mall      Calf Ankle NR NR NR 8.0 NR 14 NR 38.0 31.1       Motor NCS      Nerve / Sites Rec. Site Lat Ref. Amp Ref. Rel Amp Dist Thiago Ref. Dur. Area Temp.     ms ms mV mV % cm m/s m/s ms %  C   L MEDIAN - APB      Wrist APB 4.01 4.40 6.0 5.0 100 8   4.58 100 33.8      Elbow APB 8.65  5.7  94.2 23 49.6 48.0 4.90 90.2 33.6   L ULNAR - ADM      Wrist ADM 2.97 3.50 8.7 5.0 100 8   5.63 100 33.6      B.Elbow ADM 6.15  7.1  81.3 16.5 51.9 48.0 6.88 87 33.9      A.Elbow ADM 8.02  6.2  71.2 11 58.7 48.0 6.56 93.6 33.5   L DEEP PERONEAL - EDB      Ankle EDB 5.00 6.00 3.2 2.5 100 8   6.51 100 30.7   L TIBIAL - AH      Ankle AH 3.85 6.00 9.3 4.0 100 8   5.36 100 31       F  Wave      Nerve Min F Lat Max F Lat Mean FLat Temp.    ms ms ms  C   L MEDIAN 30.05 33.59 32.44 33.4       EMG Summary Table     Spontaneous MUAP Recruitment    IA Fib/PSW Fasc H.F. Amp Dur. PPP Pattern   L. BICEPS N None None None N N None Normal   L. DELTOID N None None None N N None Normal   L. EXT DIG COMM N None None None N N None Normal   L. TIB ANTERIOR N None None None N N None Normal   L. GASTROCN (MED) N None None None N N None Normal   L. VAST LATERALIS N None None None N N None Normal

## 2019-06-26 ENCOUNTER — TELEPHONE (OUTPATIENT)
Dept: NEUROLOGY | Facility: CLINIC | Age: 54
End: 2019-06-26

## 2019-06-26 NOTE — TELEPHONE ENCOUNTER
Writer attempted to reach the patient and schedule a follow up appointment with Dr. Jackson, here at ; [facility].    Pam[facility]  Adult Med Spec/Surg Spec   [phone]

## 2019-07-02 ENCOUNTER — OFFICE VISIT (OUTPATIENT)
Dept: NEUROLOGY | Facility: CLINIC | Age: 54
End: 2019-07-02
Payer: COMMERCIAL

## 2019-07-02 VITALS
SYSTOLIC BLOOD PRESSURE: 122 MMHG | RESPIRATION RATE: 16 BRPM | WEIGHT: 293 LBS | DIASTOLIC BLOOD PRESSURE: 81 MMHG | OXYGEN SATURATION: 94 % | BODY MASS INDEX: 51.97 KG/M2 | HEART RATE: 91 BPM

## 2019-07-02 DIAGNOSIS — R20.9 DISTURBANCE OF SKIN SENSATION: ICD-10-CM

## 2019-07-02 DIAGNOSIS — G62.9 SENSORY NEUROPATHY: ICD-10-CM

## 2019-07-02 DIAGNOSIS — G60.3 IDIOPATHIC PROGRESSIVE POLYNEUROPATHY: Primary | ICD-10-CM

## 2019-07-02 PROCEDURE — 99000 SPECIMEN HANDLING OFFICE-LAB: CPT | Performed by: PSYCHIATRY & NEUROLOGY

## 2019-07-02 PROCEDURE — 99213 OFFICE O/P EST LOW 20 MIN: CPT | Performed by: PSYCHIATRY & NEUROLOGY

## 2019-07-02 PROCEDURE — 82525 ASSAY OF COPPER: CPT | Mod: 90 | Performed by: PSYCHIATRY & NEUROLOGY

## 2019-07-02 PROCEDURE — 83921 ORGANIC ACID SINGLE QUANT: CPT | Performed by: PSYCHIATRY & NEUROLOGY

## 2019-07-02 PROCEDURE — 36415 COLL VENOUS BLD VENIPUNCTURE: CPT | Performed by: PSYCHIATRY & NEUROLOGY

## 2019-07-02 PROCEDURE — 86235 NUCLEAR ANTIGEN ANTIBODY: CPT | Mod: 91 | Performed by: PSYCHIATRY & NEUROLOGY

## 2019-07-02 PROCEDURE — 86235 NUCLEAR ANTIGEN ANTIBODY: CPT | Performed by: PSYCHIATRY & NEUROLOGY

## 2019-07-02 ASSESSMENT — PAIN SCALES - GENERAL: PAINLEVEL: SEVERE PAIN (6)

## 2019-07-02 NOTE — PATIENT INSTRUCTIONS
1. Blood test today.  2. Continue with [facility].  3. Return appointment in 3 -4 months.

## 2019-07-02 NOTE — LETTER
7/2/2019         RE: Deloris Chance  [address] [facility] [address]        Dear Colleague,    Thank you for referring your patient, Deloris Chance, to the [facility]. Please see a copy of my visit note below.    Return visit for 54 year old woman with sensory symptoms. New diagnosis of fibromyalgia. Serial EDx suggests progressive sensory ganglionopathy.         Mental state: Alert, appropriate, speech, language, and thought content normal.       Sensory examination:   Right Left   Light touch Normal Normal   Vibration (timed)     Vibration (Rydell-Seiffer) 7  MM4   MM4   Pin MF Normal   Position     Legend:   MM = medial malleolus, TT = tibial tuberosity, K = patella, MCP = MCP joint  MF = mid-foot, DC = distal calf, MC = mid calf, PC = proximal calf      Manual muscle testing (A indicates atrophy):   Right Left   Hip flexion 5 5   Knee flexion 5 5   Knee extension 5 5   Dorsiflexion 5 5   Plantar flexion 5 5     Muscle tone:   Right Left   Upper limb Normal Normal   Lower limb Normal Normal        Reflexes:   Right Left   Patellar 2 2   Achilles 2 2   Plantar Flexor Flexor   Clonus Absent Absent        Gait:  Normal.    Impression:  Possible ganglionopathy, >> 2 years duration.    Recommendations:  Despite negative sicca, obtain Sjogren's Abs.  MMA, copper.  Symptom management.  Periodic followup examinations.       Again, thank you for allowing me to participate in the care of your patient.        Sincerely,        Stanislaw Jackson MD

## 2019-07-02 NOTE — NURSING NOTE
Deloris Chance's goals for this visit include: Recheck  She requests these members of her care team be copied on today's visit information:     PCP: Tanisha Velarde    Referring Provider:  Stanislaw Jackson MD  [address] [place], [place] [id number], [place] [address]    No refills needed

## 2019-07-03 LAB
ENA SS-A IGG SER IA-ACNC: <0.2 AI (ref 0–0.9)
ENA SS-B IGG SER IA-ACNC: <0.2 AI (ref 0–0.9)

## 2019-07-05 LAB
COPPER SERPL-MCNC: 153.2 UG/DL (ref 80–155)
METHYLMALONATE SERPL-SCNC: 0.17 UMOL/L (ref 0–0.4)

## 2019-10-22 ENCOUNTER — OFFICE VISIT (OUTPATIENT)
Dept: NEUROLOGY | Facility: CLINIC | Age: 54
End: 2019-10-22
Payer: COMMERCIAL

## 2019-10-22 VITALS
DIASTOLIC BLOOD PRESSURE: 71 MMHG | WEIGHT: 293 LBS | SYSTOLIC BLOOD PRESSURE: 121 MMHG | OXYGEN SATURATION: 97 % | BODY MASS INDEX: 52.06 KG/M2 | HEART RATE: 80 BPM

## 2019-10-22 DIAGNOSIS — G62.9 SENSORY NEUROPATHY: Primary | ICD-10-CM

## 2019-10-22 DIAGNOSIS — R25.2 MUSCLE CRAMPING: ICD-10-CM

## 2019-10-22 PROCEDURE — 99213 OFFICE O/P EST LOW 20 MIN: CPT | Performed by: PSYCHIATRY & NEUROLOGY

## 2019-10-22 RX ORDER — MEXILETINE HYDROCHLORIDE 150 MG/1
CAPSULE ORAL
Qty: 90 CAPSULE | Refills: 3 | Status: SHIPPED | OUTPATIENT
Start: 2019-10-22 | End: 2020-04-07

## 2019-10-22 RX ORDER — MECLIZINE HCL 12.5 MG 12.5 MG/1
TABLET ORAL
Refills: 1 | COMMUNITY
Start: 2019-10-15

## 2019-10-22 RX ORDER — GLUCOSAMINE HCL 500 MG
100 TABLET ORAL
COMMUNITY
End: 2022-08-23

## 2019-10-22 RX ORDER — LIDOCAINE 50 MG/G
OINTMENT TOPICAL
Qty: 240 G | Refills: 1 | Status: SHIPPED | OUTPATIENT
Start: 2019-10-22

## 2019-10-22 ASSESSMENT — PAIN SCALES - GENERAL: PAINLEVEL: MODERATE PAIN (5)

## 2019-10-22 NOTE — NURSING NOTE
Deloris Chance's goals for this visit include:   Chief Complaint   Patient presents with     RECHECK     3-4 month return       She requests these members of her care team be copied on today's visit information:     PCP: Tanisha Velarde    Referring Provider:  No referring provider defined for this encounter.    /71 (BP Location: Other (Comment), Patient Position: Sitting, Cuff Size: Adult Regular)   Pulse 80   Wt (!) 150.8 kg (332 lb 6.4 oz)   SpO2 97%   BMI 52.06 kg/m      Do you need any medication refills at today's visit? No

## 2019-10-22 NOTE — LETTER
10/22/2019         RE: Deloris Chance  [address] [facility] [address]        Dear Colleague,    Thank you for referring your patient, Deloris Chance, to the [facility]. Please see a copy of my visit note below.    Return visit for 54 year old woman with idiopathic sensory neuropathy or ganglionopathy. She reports fatigue but is otherwise well. Continues to experience paresthesias, neuropathic pain, and considerable muscle cramping in hands and feet that is limiting. CMP, needle EMG normal. No benefit with magnesium.    Recent echo and EKG normal (performed because of troponin leak during hospitalization for vertigo).    Exam    Mental state: Alert, appropriate, speech, language, and thought content normal.       Sensory examination:   Right Left   Light touch Normal Normal   Vibration (timed)     Vibration (Rydell-Seiffer) MM3 MM2   Pin*     Position     *dysesthesias PC, but not sharp to at least mid-thigh, also left V digit    Legend:   MM = medial malleolus, TT = tibial tuberosity, K = patella, MCP = MCP joint  MF = mid-foot, DC = distal calf, MC = mid calf, PC = proximal calf      Manual muscle testing (A indicates atrophy):   Right Left   Shoulder abduction  5 5   Elbow extension 5 5   Elbow flexion 5 5   Wrist extension  5 5   Finger extension 5 5   FDI 5 5   APB 5 5   Hip flexion 5 5   Knee flexion 5 5   Knee extension 5 5   Dorsiflexion 5 5   Plantar flexion 5 5     Muscle tone:   Right Left   Upper limb Normal Normal   Lower limb Normal Normal        Reflexes:   Right Left   Triceps 2 2   Biceps 2 2   Brachioradialis 2 2   Salena 2 2   Patellar 2 2   Achilles 2 2   Plantar Flexor Flexor   Clonus Absent Absent      Gait:  Normal. Romberg negative.    Impression:  Sensory ganglionopathy, stable.  Muscle cramping.    Recommendations:  Mexiletine trial.  RTC 3 months.        Again, thank you for allowing me to participate in the care of your patient.         Sincerely,        Stanislaw Jackson MD

## 2019-10-22 NOTE — PROGRESS NOTES
Return visit for 54 year old woman with idiopathic sensory neuropathy or ganglionopathy. She reports fatigue but is otherwise well. Continues to experience paresthesias, neuropathic pain, and considerable muscle cramping in hands and feet that is limiting. CMP, needle EMG normal. No benefit with magnesium.    Recent echo and EKG normal (performed because of troponin leak during hospitalization for vertigo).    Exam    Mental state: Alert, appropriate, speech, language, and thought content normal.       Sensory examination:   Right Left   Light touch Normal Normal   Vibration (timed)     Vibration (Rydell-Seiffer) MM3 MM2   Pin*     Position     *dysesthesias PC, but not sharp to at least mid-thigh, also left V digit    Legend:   MM = medial malleolus, TT = tibial tuberosity, K = patella, MCP = MCP joint  MF = mid-foot, DC = distal calf, MC = mid calf, PC = proximal calf      Manual muscle testing (A indicates atrophy):   Right Left   Shoulder abduction  5 5   Elbow extension 5 5   Elbow flexion 5 5   Wrist extension  5 5   Finger extension 5 5   FDI 5 5   APB 5 5   Hip flexion 5 5   Knee flexion 5 5   Knee extension 5 5   Dorsiflexion 5 5   Plantar flexion 5 5     Muscle tone:   Right Left   Upper limb Normal Normal   Lower limb Normal Normal        Reflexes:   Right Left   Triceps 2 2   Biceps 2 2   Brachioradialis 2 2   Salena 2 2   Patellar 2 2   Achilles 2 2   Plantar Flexor Flexor   Clonus Absent Absent      Gait:  Normal. Romberg negative.    Impression:  Sensory ganglionopathy, stable.  Muscle cramping.    Recommendations:  Mexiletine trial.  RTC 3 months.

## 2019-12-09 ENCOUNTER — HEALTH MAINTENANCE LETTER (OUTPATIENT)
Age: 54
End: 2019-12-09

## 2019-12-17 ENCOUNTER — OFFICE VISIT (OUTPATIENT)
Dept: NEUROLOGY | Facility: CLINIC | Age: 54
End: 2019-12-17
Payer: COMMERCIAL

## 2019-12-17 VITALS
SYSTOLIC BLOOD PRESSURE: 138 MMHG | BODY MASS INDEX: 45.99 KG/M2 | HEIGHT: 67 IN | WEIGHT: 293 LBS | OXYGEN SATURATION: 98 % | HEART RATE: 83 BPM | DIASTOLIC BLOOD PRESSURE: 87 MMHG

## 2019-12-17 DIAGNOSIS — G62.9 SENSORY NEUROPATHY: Primary | ICD-10-CM

## 2019-12-17 PROCEDURE — 99213 OFFICE O/P EST LOW 20 MIN: CPT | Performed by: PSYCHIATRY & NEUROLOGY

## 2019-12-17 RX ORDER — LIRAGLUTIDE 6 MG/ML
INJECTION, SOLUTION SUBCUTANEOUS DAILY
COMMUNITY
Start: 2019-11-25 | End: 2022-08-23

## 2019-12-17 ASSESSMENT — PAIN SCALES - GENERAL: PAINLEVEL: MILD PAIN (3)

## 2019-12-17 ASSESSMENT — MIFFLIN-ST. JEOR: SCORE: 2085.48

## 2019-12-17 NOTE — LETTER
12/17/2019         RE: Deloris Chance  [address] [facility] [address]        Dear Colleague,    Thank you for referring your patient, Deloris Chance, to the [facility]. Please see a copy of my visit note below.    Return visit for 54 year old woman with idiopathic sensory neuropathy or ganglionopathy. She reports considerable improvement in pain and cramping on mexiletine. She would like to try to reduce gabapentin. She is off of pregabalin. She wonders whether her balance is worse but suspects that is because she is more active with reduced pain.    Mental state: Alert, appropriate, speech, language, and thought content normal.     Sensory examination:   Right Left   Light touch Normal Normal   Vibration (timed)     Vibration (Rydell-Seiffer)     Pin*     Position     Absent in feet, improves in mid-calf but does not feel normal in feet or calves, improves in hands proximal to PIP joints but is variable there as well and does not respect a particular peripheral nerve distribution.     Legend:   MM = medial malleolus, TT = tibial tuberosity, K = patella, MCP = MCP joint  MF = mid-foot, DC = distal calf, MC = mid calf, PC = proximal calf      Manual muscle testing (A indicates atrophy):   Right Left   Hip flexion 5 5   Knee flexion 5 5   Knee extension 5 5   Dorsiflexion 5 5   Plantar flexion 5 5     Muscle tone:   Right Left   Upper limb Normal Normal   Lower limb Normal Normal        Reflexes:   Right Left   Patellar 2 2   Achilles 2 2   Plantar Flexor Flexor   Clonus Absent Absent        Gait:  Independent. Tandem is within broad normal limits allowing for habitus. Sways modestly with Romberg.      Impression:  No convincing worsening of examination.     Recommendations:  OK to increase mexiletine to TID as originally prescribed and to attempt a taper of gabapentin.    Stanislaw Jackson M.D.        Again, thank you for allowing me to participate in the care of your patient.         Sincerely,        Stanislaw Jackson MD

## 2019-12-17 NOTE — NURSING NOTE
"Deloris Chance's goals for this visit include: return  She requests these members of her care team be copied on today's visit information:     PCP: Tanisha Velarde    Referring Provider:  No referring provider defined for this encounter.    /87   Pulse 83   Ht 1.702 m (5' 7\")   Wt 146.3 kg (322 lb 8 oz)   SpO2 98%   BMI 50.51 kg/m      Do you need any medication refills at today's visit? ?  "

## 2019-12-17 NOTE — PROGRESS NOTES
Return visit for 54 year old woman with idiopathic sensory neuropathy or ganglionopathy. She reports considerable improvement in pain and cramping on mexiletine. She would like to try to reduce gabapentin. She is off of pregabalin. She wonders whether her balance is worse but suspects that is because she is more active with reduced pain.    Mental state: Alert, appropriate, speech, language, and thought content normal.     Sensory examination:   Right Left   Light touch Normal Normal   Vibration (timed)     Vibration (Rydell-Seiffer)     Pin*     Position     Absent in feet, improves in mid-calf but does not feel normal in feet or calves, improves in hands proximal to PIP joints but is variable there as well and does not respect a particular peripheral nerve distribution.     Legend:   MM = medial malleolus, TT = tibial tuberosity, K = patella, MCP = MCP joint  MF = mid-foot, DC = distal calf, MC = mid calf, PC = proximal calf      Manual muscle testing (A indicates atrophy):   Right Left   Hip flexion 5 5   Knee flexion 5 5   Knee extension 5 5   Dorsiflexion 5 5   Plantar flexion 5 5     Muscle tone:   Right Left   Upper limb Normal Normal   Lower limb Normal Normal        Reflexes:   Right Left   Patellar 2 2   Achilles 2 2   Plantar Flexor Flexor   Clonus Absent Absent        Gait:  Independent. Tandem is within broad normal limits allowing for habitus. Sways modestly with Romberg.      Impression:  No convincing worsening of examination.     Recommendations:  OK to increase mexiletine to TID as originally prescribed and to attempt a taper of gabapentin.    Stanislaw Jackson M.D.

## 2019-12-17 NOTE — PATIENT INSTRUCTIONS
1. Reduce gabapentin to twice a day. If your pain is worse mid-day let me know and we can change to a lower dose three times a day. If you are no worse at twice daily however you can reduce to once a day after 4 weeks at twice a day.  2. Increase mexiletine to three times a day.

## 2020-03-15 ENCOUNTER — HEALTH MAINTENANCE LETTER (OUTPATIENT)
Age: 55
End: 2020-03-15

## 2020-03-17 ENCOUNTER — TELEPHONE (OUTPATIENT)
Dept: NEUROLOGY | Facility: CLINIC | Age: 55
End: 2020-03-17

## 2020-03-17 ENCOUNTER — VIRTUAL VISIT (OUTPATIENT)
Dept: NEUROLOGY | Facility: CLINIC | Age: 55
End: 2020-03-17

## 2020-03-17 DIAGNOSIS — G62.9 SENSORY NEUROPATHY: Primary | ICD-10-CM

## 2020-03-17 PROCEDURE — 99442 ZZC PHYSICIAN TELEPHONE EVALUATION 11-20 MIN: CPT | Performed by: PSYCHIATRY & NEUROLOGY

## 2020-03-17 RX ORDER — LIRAGLUTIDE 6 MG/ML
3 INJECTION, SOLUTION SUBCUTANEOUS
COMMUNITY
Start: 2020-02-27 | End: 2022-08-23

## 2020-03-17 NOTE — TELEPHONE ENCOUNTER
Copy of office note faxed [phone]  I asked clinic to call us at [phone] to confirm receipt and for clinician to call Dr. Jackson at [phone] with questions.    Annabel Chaudhari LPN

## 2020-03-17 NOTE — Clinical Note
Please fax my note to PCP and ask that they confirm that she has reviewed my recommendations. She is welcome to call either here today or [phone] to discuss with me. Thank you.

## 2020-03-17 NOTE — PROGRESS NOTES
Deloris Chance's goals for this visit include: Return  She requests these members of her care team be copied on today's visit information:     PCP: Tanisha Velarde    Referring Provider:  No referring provider defined for this encounter.    There were no vitals taken for this visit.    Do you need any medication refills at today's visit? Yes

## 2020-03-17 NOTE — TELEPHONE ENCOUNTER
Stanislaw Jackson MD  P [facility] Neurology Adult [place]               Please fax my note to PCP and ask that they confirm that she has reviewed my recommendations. She is welcome to call either here today or [phone] to discuss with me. Thank you.

## 2020-03-17 NOTE — PROGRESS NOTES
"Deloris Chance is a 55 year old female who is being evaluated via a billable telephone visit.      The patient has been notified of following:     \"This telephone visit will be conducted via a call between you and your physician/provider. We have found that certain health care needs can be provided without the need for a physical exam.  This service lets us provide the care you need with a short phone conversation.  If a prescription is necessary we can send it directly to your pharmacy.  If lab work is needed we can place an order for that and you can then stop by our lab to have the test done at a later time.    If during the course of the call the physician/provider feels a telephone visit is not appropriate, you will not be charged for this service.\"       Deloris Chance complains of    Chief Complaint   Patient presents with     RECHECK     Refill Request       I have reviewed and updated the patient's Past Medical History, Social History, Family History and Medication List.    ALLERGIES  Hydroxychloroquine; Levofloxacin; Penicillins; and Tramadol    Ms Chance reports several months of lightheadedness when walking resulting in activity intolerance. There is no associated presyncope, syncope, chest discomfort, palpitation, tachycardia, vertigo, or focal neurological symptom such as focal weakness, numbness, cognitive disturbance, dysarthria, visual change, or true vertigo. This has been happening for several months.     She started Saxenda in December. Tachycardia and hypotension are rare AEs.    She reports NO change in her neurological symptoms.    Cardiac stress echo was normal last fall, after starting mexiletine.    Mild elevation in ESR and CRP last week in the absence of any symptoms of active inflammation or infection (reviewed in detail with patient).    Reduction in sweating for a long period of time. No recent change. No unexplained early satiety, constipation or diarrhea, or " incontinence.    Impression    Unexplained exertional lightheadedness in patient with sensory neuropathy, equivocal autonomic symptoms otherwise, mild non-specific elevation in some inflammatory markers.     Recommend    1. Neurologic examination - she declines as she feels it will be no different and she is minimizing coronavirus exposure risk.  2. Repeat and expand inflammatory markers: ESR, CRP, paraneoplastic panel, C3, C4, ANCA, MILI, RF.  3. Refer back to cardiology for consideration of cardiovagal testing, continuous BP and HR monitor, or both.  4. Consideration of discontinuing Saxenda if felt to be a likely cause.    Will contact PCP.     I have reviewed the note as documented above.  This accurately captures the substance of my conversation with the patient.        Phone call contact time  Call Started at 10:03  Call Ended at 10:16    Stanislaw Jackson M.D.

## 2020-03-31 ENCOUNTER — TRANSFERRED RECORDS (OUTPATIENT)
Dept: HEALTH INFORMATION MANAGEMENT | Facility: CLINIC | Age: 55
End: 2020-03-31

## 2020-04-07 DIAGNOSIS — R25.2 MUSCLE CRAMPING: ICD-10-CM

## 2020-04-07 RX ORDER — MEXILETINE HYDROCHLORIDE 150 MG/1
CAPSULE ORAL
Qty: 90 CAPSULE | Refills: 3 | Status: SHIPPED | OUTPATIENT
Start: 2020-04-07 | End: 2020-09-28

## 2020-06-16 ENCOUNTER — VIRTUAL VISIT (OUTPATIENT)
Dept: NEUROLOGY | Facility: CLINIC | Age: 55
End: 2020-06-16

## 2020-06-16 DIAGNOSIS — G62.9 SENSORY NEUROPATHY: Primary | ICD-10-CM

## 2020-06-16 PROCEDURE — 99211 OFF/OP EST MAY X REQ PHY/QHP: CPT | Mod: 95 | Performed by: PSYCHIATRY & NEUROLOGY

## 2020-06-16 NOTE — PROGRESS NOTES
"Deloris Chance is a 55 year old female who is being evaluated via a billable video visit.      The patient has been notified of following:     \"This video visit will be conducted via a call between you and your physician/provider. We have found that certain health care needs can be provided without the need for an in-person physical exam.  This service lets us provide the care you need with a video conversation.  If a prescription is necessary we can send it directly to your pharmacy.  If lab work is needed we can place an order for that and you can then stop by our lab to have the test done at a later time.    Video visits are billed at different rates depending on your insurance coverage.  Please reach out to your insurance provider with any questions.    If during the course of the call the physician/provider feels a video visit is not appropriate, you will not be charged for this service.\"    Patient has given verbal consent for Video visit? Yes      Video-Visit Details    Type of service:  Video Visit    Video Start Time: 4:04  Video End Time: 4:09    Originating Location (pt. Location): Home    Distant Location (provider location):  [facility]     Platform used for Video Visit: [facility]       Patient reports no new symptoms. Cardiologist felt she required no evaluation for presyncope and with increased fluid and activity she is better. Labs normal or unremarkable. Recommend followup for examination in 4-6 months, then prn if stable.        Stanislaw Jackson MD      "

## 2020-06-16 NOTE — LETTER
"    6/16/2020         RE: Deloris Chance  [address] [facility] [address]        Dear Colleague,    Thank you for referring your patient, Deloris Chance, to the [facility]. Please see a copy of my visit note below.    Deloris Chance is a 55 year old female who is being evaluated via a billable video visit.      The patient has been notified of following:     \"This video visit will be conducted via a call between you and your physician/provider. We have found that certain health care needs can be provided without the need for an in-person physical exam.  This service lets us provide the care you need with a video conversation.  If a prescription is necessary we can send it directly to your pharmacy.  If lab work is needed we can place an order for that and you can then stop by our lab to have the test done at a later time.    Video visits are billed at different rates depending on your insurance coverage.  Please reach out to your insurance provider with any questions.    If during the course of the call the physician/provider feels a video visit is not appropriate, you will not be charged for this service.\"    Patient has given verbal consent for Video visit? Yes      Video-Visit Details    Type of service:  Video Visit    Video Start Time: 4:04  Video End Time: 4:09    Originating Location (pt. Location): Home    Distant Location (provider location):  [facility]     Platform used for Video Visit: [employer]       Patient reports no new symptoms. Cardiologist felt she required no evaluation for presyncope and with increased fluid and activity she is better. Labs normal or unremarkable. Recommend followup for examination in 4-6 months, then prn if stable.        Stanislaw Jackson MD        Again, thank you for allowing me to participate in the care of your patient.        Sincerely,        Stanislaw Jackson MD    "

## 2020-09-28 DIAGNOSIS — R25.2 MUSCLE CRAMPING: ICD-10-CM

## 2020-09-28 RX ORDER — MEXILETINE HYDROCHLORIDE 150 MG/1
CAPSULE ORAL
Qty: 90 CAPSULE | Refills: 1 | Status: SHIPPED | OUTPATIENT
Start: 2020-09-28 | End: 2021-01-05

## 2021-01-04 DIAGNOSIS — R25.2 MUSCLE CRAMPING: ICD-10-CM

## 2021-01-05 RX ORDER — MEXILETINE HYDROCHLORIDE 150 MG/1
CAPSULE ORAL
Qty: 90 CAPSULE | Refills: 1 | Status: SHIPPED | OUTPATIENT
Start: 2021-01-05 | End: 2021-04-09

## 2021-01-05 NOTE — TELEPHONE ENCOUNTER
Request not authorized per refill protocol.         Routed to the provider to review and sign. She is scheduled to return for a follow up appt on 1/19/21.  Leti Humphreys, RNCC  Neurology

## 2021-01-14 ENCOUNTER — HEALTH MAINTENANCE LETTER (OUTPATIENT)
Age: 56
End: 2021-01-14

## 2021-01-19 ENCOUNTER — VIRTUAL VISIT (OUTPATIENT)
Dept: NEUROLOGY | Facility: CLINIC | Age: 56
End: 2021-01-19
Payer: MEDICAID

## 2021-01-19 DIAGNOSIS — G62.9 SENSORY NEUROPATHY: Primary | ICD-10-CM

## 2021-01-19 PROCEDURE — 99213 OFFICE O/P EST LOW 20 MIN: CPT | Mod: 95 | Performed by: PSYCHIATRY & NEUROLOGY

## 2021-01-19 NOTE — PROGRESS NOTES
2021            Tanisha Velarde NP   [facility]   [address] [place]  [address]      Re:   Esperanza Singh   MRN -[id number]    1965      Dear Ms. Velarde:      I met with Esperanza Singh by telehealth today to follow-up on her idiopathic sensory neuropathy or ganglionopathy.  She reports her symptoms are stable.  She has had considerable benefit from mexiletine, which helps with both pain and cramping.  She does not feel that her subjective sensory loss in the hands and feet has progressed.  She indicates occasional lightheadedness that she described at her last visit, has also worsened and is not limiting.      By telehealth video visit, I was able to ascertain that she walks comfortably and safely.  She has full use of the hands.      I explained that periodic reevaluation, at decreasing intervals if stable, is appropriate in this context and that we will set up a followup in the summer.  If her condition remains unchanged after a period of time she could followup with you if you are willing to write her prescriptions, and return to Neurology on an as-needed basis.            Sincerely,      ANSLEY ALOFNSO MD             D: 2021   T: 2021   MT: MAUREEN      Name:     ESPERANZA SINGH   MRN:      [id number]        Account:      [id number]   :      1965      Document: [id number]       cc: Tanisha Velarde NP     20 minutes spent on chart review, encounter, , care coordination and chart completion on date of service.

## 2021-01-19 NOTE — PROGRESS NOTES
Deloris is a 56 year old who is being evaluated via a billable video visit.      How would you like to obtain your AVS? [employer]  If the video visit is dropped, the invitation should be resent by: Other e-mail: [employer]  Will anyone else be joining your video visit? No       Video visit time 8:31 - 8:35    [id number]

## 2021-01-19 NOTE — LETTER
2021         RE: Esperanza Singh  [address] [facility] [address] [[email or web address] [address]        Dear Colleague,    Thank you for referring your patient, Esperanza Singh, to the [facility] [place]. Please see a copy of my visit note below.    Esperanza is a 56 year old who is being evaluated via a billable video visit.      How would you like to obtain your AVS? [email or web address]  If the video visit is dropped, the invitation should be resent by: Other e-mail: [employer]  Will anyone else be joining your video visit? No       Video visit time 8:31 - 8:35    [id number]        2021            Tanisha Velarde NP   [facility]   [address] [place]  [address]      Re:   Esperanza Singh   MRN -[id number]    1965      Dear Ms. Velarde:      I met with Esperanza Singh by telehealth today to follow-up on her idiopathic sensory neuropathy or ganglionopathy.  She reports her symptoms are stable.  She has had considerable benefit from mexiletine, which helps with both pain and cramping.  She does not feel that her subjective sensory loss in the hands and feet has progressed.  She indicates occasional lightheadedness that she described at her last visit, has also worsened and is not limiting.      By telehealth video visit, I was able to ascertain that she walks comfortably and safely.  She has full use of the hands.      I explained that periodic reevaluation, at decreasing intervals if stable, is appropriate in this context and that we will set up a followup in the summer.  If her condition remains unchanged after a period of time she could followup with you if you are willing to write her prescriptions, and return to Neurology on an as-needed basis.            Sincerely,      ANSLEY ALFONSO MD             D: 2021   T: 2021   MT: MAUREEN      Name:     ESPERANZA SINGH   MRN:      [id number]        Account:      [id number]   :      1965       Document: [id number]       cc: Tanisha Velarde NP     20 minutes spent on chart review, encounter, , care coordination and chart completion on date of service.          Again, thank you for allowing me to participate in the care of your patient.        Sincerely,        Stanislaw Jackson MD

## 2021-04-08 DIAGNOSIS — R25.2 MUSCLE CRAMPING: ICD-10-CM

## 2021-04-09 RX ORDER — MEXILETINE HYDROCHLORIDE 150 MG/1
CAPSULE ORAL
Qty: 90 CAPSULE | Refills: 1 | Status: SHIPPED | OUTPATIENT
Start: 2021-04-09 | End: 2021-08-24

## 2021-05-30 ENCOUNTER — RECORDS - HEALTHEAST (OUTPATIENT)
Dept: ADMINISTRATIVE | Facility: CLINIC | Age: 56
End: 2021-05-30

## 2021-06-02 ENCOUNTER — RECORDS - HEALTHEAST (OUTPATIENT)
Dept: ADMINISTRATIVE | Facility: CLINIC | Age: 56
End: 2021-06-02

## 2021-06-03 ENCOUNTER — RECORDS - HEALTHEAST (OUTPATIENT)
Dept: ADMINISTRATIVE | Facility: CLINIC | Age: 56
End: 2021-06-03

## 2021-07-20 ENCOUNTER — OFFICE VISIT (OUTPATIENT)
Dept: NEUROLOGY | Facility: CLINIC | Age: 56
End: 2021-07-20
Payer: COMMERCIAL

## 2021-07-20 VITALS
DIASTOLIC BLOOD PRESSURE: 85 MMHG | HEART RATE: 78 BPM | RESPIRATION RATE: 20 BRPM | SYSTOLIC BLOOD PRESSURE: 145 MMHG | OXYGEN SATURATION: 99 %

## 2021-07-20 DIAGNOSIS — G62.9 SENSORY NEUROPATHY: Primary | ICD-10-CM

## 2021-07-20 DIAGNOSIS — R25.2 MUSCLE CRAMPING: ICD-10-CM

## 2021-07-20 PROCEDURE — 99212 OFFICE O/P EST SF 10 MIN: CPT | Performed by: PSYCHIATRY & NEUROLOGY

## 2021-07-20 RX ORDER — HYDROXYZINE HYDROCHLORIDE 10 MG/1
10 TABLET, FILM COATED ORAL
COMMUNITY
Start: 2021-06-21

## 2021-07-20 RX ORDER — FAMOTIDINE 40 MG/1
40 TABLET, FILM COATED ORAL
COMMUNITY
Start: 2021-06-21

## 2021-07-20 NOTE — PROGRESS NOTES
Return visit for 56 year old woman with sensory neuropathy. She still obtains substantial benefit from mexiletine. No progression of sensory symptoms.      Mental state: Alert, appropriate, speech, language, and thought content normal.       Sensory examination:     Right Left   Light touch Normal Normal   Vibration (timed) 3 MM3   Vibration (Rydell-Seiffer)     Temp     Pin Broadly reduced Broadly reduced   Pos     Legend:   MM = medial malleolus, TT = tibial tuberosity, K = patella, MCP = MCP joint  MF = mid-foot, DC = distal calf, MC = mid calf, PC = proximal calf      Motor examination:     Right Left   Shoulder abduction  5 5   Elbow extension 5 5   Elbow flexion 5 5   Wrist extension  5 5   Finger extension 5 5   FDI 5 5   APB 5 5   Hip flexion 5 5   Knee flexion 5 5   Knee extension 5 5   Dorsiflexion 5 5   Plantar flexion 5 5   A=atrophy    Tone normal     Reflexes:   Right Left   Biceps 2 2   BRD 2 2   Triceps 2 2   Salena 2 2   Patellar 2 2   Achilles 1 1   Plantar Flexor Flexor   Clonus Absent Absent      Coordination:  Finger-nose normal.  Heel-shin normal.  RRMs normal.    Gait:  Normal.      Impression:  Stable examination.    Recommendations:  RTC 6 months.   Offered the option of RTC prn if PCP willing to continue mexiletine prescribing - she will discuss with her.    Stanislaw Jackson M.D.    12 minutes spent on the date of the encounter on chart review, history and examination, documentation and further activities as noted above.

## 2021-07-20 NOTE — LETTER
7/20/2021         RE: Deloris Chance  [address] [facility] [address]        Dear Colleague,    Thank you for referring your patient, Deloris Chance, to the [facility] [place]. Please see a copy of my visit note below.    Return visit for 56 year old woman with sensory neuropathy. She still obtains substantial benefit from mexiletine. No progression of sensory symptoms.      Mental state: Alert, appropriate, speech, language, and thought content normal.       Sensory examination:     Right Left   Light touch Normal Normal   Vibration (timed) 3 MM3   Vibration (Rydell-Seiffer)     Temp     Pin Broadly reduced Broadly reduced   Pos     Legend:   MM = medial malleolus, TT = tibial tuberosity, K = patella, MCP = MCP joint  MF = mid-foot, DC = distal calf, MC = mid calf, PC = proximal calf      Motor examination:     Right Left   Shoulder abduction  5 5   Elbow extension 5 5   Elbow flexion 5 5   Wrist extension  5 5   Finger extension 5 5   FDI 5 5   APB 5 5   Hip flexion 5 5   Knee flexion 5 5   Knee extension 5 5   Dorsiflexion 5 5   Plantar flexion 5 5   A=atrophy    Tone normal     Reflexes:   Right Left   Biceps 2 2   BRD 2 2   Triceps 2 2   Salena 2 2   Patellar 2 2   Achilles 1 1   Plantar Flexor Flexor   Clonus Absent Absent      Coordination:  Finger-nose normal.  Heel-shin normal.  RRMs normal.    Gait:  Normal.      Impression:  Stable examination.    Recommendations:  RTC 6 months.   Offered the option of RTC prn if PCP willing to continue mexiletine prescribing - she will discuss with her.    Stanislaw Jackson M.D.    12 minutes spent on the date of the encounter on chart review, history and examination, documentation and further activities as noted above.            Again, thank you for allowing me to participate in the care of your patient.        Sincerely,        Stanislaw Jackson MD

## 2021-08-24 DIAGNOSIS — R25.2 MUSCLE CRAMPING: ICD-10-CM

## 2021-08-24 RX ORDER — MEXILETINE HYDROCHLORIDE 150 MG/1
CAPSULE ORAL
Qty: 90 CAPSULE | Refills: 1 | Status: SHIPPED | OUTPATIENT
Start: 2021-08-24 | End: 2021-12-06

## 2021-10-24 ENCOUNTER — HEALTH MAINTENANCE LETTER (OUTPATIENT)
Age: 56
End: 2021-10-24

## 2021-12-05 DIAGNOSIS — R25.2 MUSCLE CRAMPING: ICD-10-CM

## 2021-12-06 RX ORDER — MEXILETINE HYDROCHLORIDE 150 MG/1
CAPSULE ORAL
Qty: 90 CAPSULE | Refills: 1 | Status: SHIPPED | OUTPATIENT
Start: 2021-12-06 | End: 2022-01-18

## 2021-12-06 NOTE — TELEPHONE ENCOUNTER
Writer received a refill request from: [facility].     Medication: mexiletine (MEXITIL) 150 MG capsule  Sig: Take 1 capsule by mouth three times daily if needed for muscle cramp  Date last written: 8/24/21  Dispensed amount: 90  Refills: 1        Pt's last office visit: 7/20/21  Next scheduled office visit: 1/18/22      Per the RN/LPN medication refill protocol, writer is unable to refill this request.

## 2022-01-17 ENCOUNTER — TELEPHONE (OUTPATIENT)
Dept: NEUROLOGY | Facility: CLINIC | Age: 57
End: 2022-01-17
Payer: MEDICARE

## 2022-01-17 NOTE — TELEPHONE ENCOUNTER
Prior Authorization Retail Medication Request    Medication/Dose: Mexiletine 150 mg  ICD code (if different than what is on RX):  R25.2  Previously Tried and Failed:    Rationale:      Insurance Name:    Insurance ID:        Pharmacy Information (if different than what is on RX)  Name:  [facility]  Phone:  [phone]

## 2022-01-18 ENCOUNTER — VIRTUAL VISIT (OUTPATIENT)
Dept: NEUROLOGY | Facility: CLINIC | Age: 57
End: 2022-01-18
Payer: MEDICARE

## 2022-01-18 ENCOUNTER — TELEPHONE (OUTPATIENT)
Dept: NEUROLOGY | Facility: CLINIC | Age: 57
End: 2022-01-18

## 2022-01-18 DIAGNOSIS — G62.9 SENSORY NEUROPATHY: Primary | ICD-10-CM

## 2022-01-18 DIAGNOSIS — R25.2 MUSCLE CRAMPING: ICD-10-CM

## 2022-01-18 PROCEDURE — 99213 OFFICE O/P EST LOW 20 MIN: CPT | Mod: 95 | Performed by: PSYCHIATRY & NEUROLOGY

## 2022-01-18 RX ORDER — MEXILETINE HYDROCHLORIDE 150 MG/1
150 CAPSULE ORAL 3 TIMES DAILY PRN
Qty: 90 CAPSULE | Refills: 3 | Status: SHIPPED | OUTPATIENT
Start: 2022-01-18

## 2022-01-18 RX ORDER — GABAPENTIN 600 MG/1
600 TABLET ORAL AT BEDTIME
Qty: 90 TABLET | Refills: 1 | Status: SHIPPED | OUTPATIENT
Start: 2022-01-18

## 2022-01-18 NOTE — LETTER
1/18/2022         RE: Deloris Chance  [address] [facility] [address]        Dear Colleague,    Thank you for referring your patient, Deloris Chance, to the [facility] [place]. Please see a copy of my visit note below.    Deloris is a 57 year old who is being evaluated via a billable video visit.      How would you like to obtain your AVS? MyChart  If the video visit is dropped, the invitation should be resent by: Text to cell phone: [phone]  Will anyone else be joining your video visit? 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    More difficulty using hands, possibly due to weakness. Has cut her feet because of insensitivity, but no falls and balance seems OK. Mexiletine helps cramps. Gabapentin helps with pain, itching, and cramping and is not at HS only. No recurrence of vertigo. No radicular syndrome. No abrupt change.    Video cut out shortly after initiating the call. Lateral EOMs appear functionally full. No definite hand atrophy.     Reviewed prior laboratory findings, including rheumatology and cardiology evaluations. Imaging of brain and spine negative per report.    I refilled medications and will arrange an in-person visit to re-examine, in light of worsening UE symptoms and possible new considerations in the evaluation of sensory neuropathy.    Stanislaw Jackson M.D.    20 minutes spent on the date of the encounter on chart review, history and examination, documentation and further activities as noted above. About 13 minutes video time.       Again, thank you for allowing me to participate in the care of your patient.        Sincerely,        Stanislaw Jackson MD

## 2022-01-18 NOTE — PROGRESS NOTES
Deloris is a 57 year old who is being evaluated via a billable video visit.      How would you like to obtain your AVS? MyChart  If the video visit is dropped, the invitation should be resent by: Text to cell phone: [phone]  Will anyone else be joining your video visit? No

## 2022-01-18 NOTE — TELEPHONE ENCOUNTER
I called patient to schedule follow up for 2 months-pt prefers afternoon appt. Pt booked 4/11/2022    Annabel Chaudhari LPN

## 2022-01-18 NOTE — PROGRESS NOTES
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    More difficulty using hands, possibly due to weakness. Has cut her feet because of insensitivity, but no falls and balance seems OK. Mexiletine helps cramps. Gabapentin helps with pain, itching, and cramping and is not at HS only. No recurrence of vertigo. No radicular syndrome. No abrupt change.    Video cut out shortly after initiating the call. Lateral EOMs appear functionally full. No definite hand atrophy.     Reviewed prior laboratory findings, including rheumatology and cardiology evaluations. Imaging of brain and spine negative per report.    I refilled medications and will arrange an in-person visit to re-examine, in light of worsening UE symptoms and possible new considerations in the evaluation of sensory neuropathy.    Stanislaw Jackson M.D.    20 minutes spent on the date of the encounter on chart review, history and examination, documentation and further activities as noted above. About 13 minutes video time.

## 2022-01-18 NOTE — Clinical Note
See follow up and patient instructions. Also I cannot access PACS in exam 4 (it is loaded but will not open). Please ask IT to address. Thanks.

## 2022-01-18 NOTE — PATIENT INSTRUCTIONS
I refilled your medications.  With worsening symptoms in the hands, I will arrange a follow up visit.

## 2022-01-19 NOTE — TELEPHONE ENCOUNTER
Central Prior Authorization Team   Phone: [phone]    PA Initiation    Medication: mexiletine (MEXITIL) 150 MG capsule  Insurance Company: [employer] - Phone [phone] Fax [phone]  Pharmacy Filling the Rx: [facility] - [address]  Filling Pharmacy Phone: [phone]  Filling Pharmacy Fax: [phone]  Start Date: 1/19/2022

## 2022-01-20 NOTE — TELEPHONE ENCOUNTER
Prior Authorization Approval    Authorization Effective Date: 1/1/2022  Authorization Expiration Date: 1/19/2023  Medication: mexiletine (MEXITIL) 150 MG capsule-PA APPROVED   Approved Dose/Quantity:   Reference #:     Insurance Company: Dannielle Cao - Phone [phone] Fax [phone]  Expected CoPay:       CoPay Card Available:      Foundation Assistance Needed:    Which Pharmacy is filling the prescription (Not needed for infusion/clinic administered): [facility] - [address]  Pharmacy Notified: Yes- **Instructed pharmacy to notify patient when script is ready to /ship.**  Patient Notified: Yes

## 2022-02-13 ENCOUNTER — HEALTH MAINTENANCE LETTER (OUTPATIENT)
Age: 57
End: 2022-02-13

## 2022-04-10 ENCOUNTER — HEALTH MAINTENANCE LETTER (OUTPATIENT)
Age: 57
End: 2022-04-10

## 2022-08-23 ENCOUNTER — OFFICE VISIT (OUTPATIENT)
Dept: NEUROLOGY | Facility: CLINIC | Age: 57
End: 2022-08-23
Payer: MEDICARE

## 2022-08-23 VITALS
SYSTOLIC BLOOD PRESSURE: 134 MMHG | HEART RATE: 85 BPM | BODY MASS INDEX: 45.99 KG/M2 | HEIGHT: 67 IN | WEIGHT: 293 LBS | DIASTOLIC BLOOD PRESSURE: 78 MMHG | OXYGEN SATURATION: 95 %

## 2022-08-23 DIAGNOSIS — G62.9 SENSORY NEUROPATHY: Primary | ICD-10-CM

## 2022-08-23 DIAGNOSIS — R25.2 MUSCLE CRAMPING: ICD-10-CM

## 2022-08-23 PROCEDURE — 99213 OFFICE O/P EST LOW 20 MIN: CPT | Performed by: PSYCHIATRY & NEUROLOGY

## 2022-08-23 ASSESSMENT — PAIN SCALES - GENERAL: PAINLEVEL: MILD PAIN (2)

## 2022-08-23 NOTE — LETTER
8/23/2022         RE: Deloris Chance  [address] [facility] [address]        Dear Colleague,    Thank you for referring your patient, Deloris Chance, to the [facility]OLOG[facility] [place]. Please see a copy of my visit note below.    Return visit for 57 year old woman with sensory neuropathy. She reports that her condition is stable since she was last seen. Doing well on mexiletine and duloxetine.       Mental state: Alert, appropriate, speech, language, and thought content normal.     Cranial nerves II-XII normal.    Sensory examination:     Right Left   Light touch Normal Normal   Vibration (timed)     Vibration (Rydell-Seiffer) MM3 MM3   Temp     Pin Normal Normal   Pos     Legend:   MM = medial malleolus, TT = tibial tuberosity, K = patella, MCP = MCP joint  MF = mid-foot, DC = distal calf, MC = mid calf, PC = proximal calf      Motor examination:     Right Left   Shoulder abduction  5 5   Elbow extension 5 5   Elbow flexion 5 5   Wrist extension  5 5   Finger extension 5 5   FDI 5 5   APB 5 5   Hip flexion 5 5   Knee flexion 5 5   Knee extension 5 5   Dorsiflexion 5 5   Plantar flexion 5 5   A=atrophy    Tone normal     Reflexes:   Right Left   Biceps 2 2   BRD 2 2   Triceps 2 2   Salena 2 2   Patellar 2 2   Achilles 2 2   Plantar Flexor Flexor   Clonus Absent Absent      Coordination:  Finger-nose normal.  Heel-shin normal.  RRMs normal.    Gait:  Normal.      Impression:  Examination is stable. Labs are unremarkable.    Recommendations:  Continue current management.   RTC 6 months.      Stanislaw Jackson M.D.      22 minutes spent on the date of the encounter on chart review, history and examination, documentation and further activities as noted above.            Again, thank you for allowing me to participate in the care of your patient.        Sincerely,        Stanislaw Jackson MD

## 2022-08-23 NOTE — NURSING NOTE
"Deloris Chance's goals for this visit include:   Chief Complaint   Patient presents with     RECHECK     Neuropthy // 2-3 month follow up       She requests these members of her care team be copied on today's visit information: yes    PCP: Tanisha Velarde    Referring Provider:  Stanislaw Jackson MD  [address] [place], [place] [id number]  [place] [address]    Pulse 85   Ht 1.702 m (5' 7\")   Wt 146.1 kg (322 lb)   SpO2 95%   BMI 50.43 kg/m      Do you need any medication refills at today's visit? Yes  IRINA Gaitan., CMA ([facility])      "

## 2022-08-23 NOTE — PROGRESS NOTES
Return visit for 57 year old woman with sensory neuropathy. She reports that her condition is stable since she was last seen. Doing well on mexiletine and duloxetine.       Mental state: Alert, appropriate, speech, language, and thought content normal.     Cranial nerves II-XII normal.    Sensory examination:     Right Left   Light touch Normal Normal   Vibration (timed)     Vibration (Rydell-Seiffer) MM3 MM3   Temp     Pin Normal Normal   Pos     Legend:   MM = medial malleolus, TT = tibial tuberosity, K = patella, MCP = MCP joint  MF = mid-foot, DC = distal calf, MC = mid calf, PC = proximal calf      Motor examination:     Right Left   Shoulder abduction  5 5   Elbow extension 5 5   Elbow flexion 5 5   Wrist extension  5 5   Finger extension 5 5   FDI 5 5   APB 5 5   Hip flexion 5 5   Knee flexion 5 5   Knee extension 5 5   Dorsiflexion 5 5   Plantar flexion 5 5   A=atrophy    Tone normal     Reflexes:   Right Left   Biceps 2 2   BRD 2 2   Triceps 2 2   Salena 2 2   Patellar 2 2   Achilles 2 2   Plantar Flexor Flexor   Clonus Absent Absent      Coordination:  Finger-nose normal.  Heel-shin normal.  RRMs normal.    Gait:  Normal.      Impression:  Examination is stable. Labs are unremarkable.    Recommendations:  Continue current management.   RTC 6 months.      Stanislaw Jackson M.D.      22 minutes spent on the date of the encounter on chart review, history and examination, documentation and further activities as noted above.

## 2022-10-15 ENCOUNTER — HEALTH MAINTENANCE LETTER (OUTPATIENT)
Age: 57
End: 2022-10-15

## 2023-02-28 ENCOUNTER — VIRTUAL VISIT (OUTPATIENT)
Dept: NEUROLOGY | Facility: CLINIC | Age: 58
End: 2023-02-28
Payer: MEDICARE

## 2023-02-28 DIAGNOSIS — G62.9 SENSORY NEUROPATHY: Primary | ICD-10-CM

## 2023-02-28 DIAGNOSIS — R25.2 MUSCLE CRAMPING: ICD-10-CM

## 2023-02-28 PROCEDURE — 99212 OFFICE O/P EST SF 10 MIN: CPT | Mod: VID | Performed by: PSYCHIATRY & NEUROLOGY

## 2023-02-28 NOTE — PROGRESS NOTES
Visit Date: 2023      Tanisha Velarde N.P.  [address] [address]    RE:          Esperanza Chance  :       1965  MRN#:     [id number]    Dear Ms. Velarde:    I saw Esperanza Chance in followup via a Telehealth visit today.  As you know, I have seen her for followup of an idiopathic sensory neuropathy.  Ms. Chance reports her condition is stable and her symptoms are adequately controlled.  Her only question today is that she has a focal cramp lasting minutes that is quite uncomfortable and occurring once every 3 to 4 weeks.  I recommended some over-the-counter options for management.     I personally reviewed her laboratory studies, which are normal, except for a presumably nonfasting elevated blood sugar.    She is comfortable continuing with me on an as-needed basis as long as you continue to write her prescriptions.  For individuals on gabapentin and duloxetine, I think it is reasonable to obtain a comprehensive metabolic panel on an annual basis.  If there are features of the metabolic syndrome in the context of sensory neuropathy, aggressive management with statins and, if indicated, oral hypoglycemics, is appropriate.    Stanislaw Jackson MD    10 minutes spent on the date of the encounter on chart review, history and examination, documentation and further activities as noted above.    D: 2023   T: 2023   MT: VERONIQUE    Name:     ESPERANZA CHANCE  MRN:      [id number]        Account:    [id number]   :      1965           Visit Date: 2023     Document: [id number]    cc:  Tanisha Velarde NP      Initial (On Arrival)

## 2023-02-28 NOTE — PROGRESS NOTES
Deloris is a 58 year old who is being evaluated via a billable video visit.      How would you like to obtain your AVS? MyChart  If the video visit is dropped, the invitation should be resent by: Text to cell phone: [phone]  Will anyone else be joining your video visit? No        Video-Visit Details    Type of service:  Video Visit   Video Start Time: 8:06  Video End Time:8:16 AM    Originating Location (pt. Location): Home    Distant Location (provider location):  On-site  Platform used for Video Visit: [employer]

## 2023-02-28 NOTE — LETTER
2023         RE: Deloris Chance   [facility] [address]        Dear Colleague,    Thank you for referring your patient, Deloris Chance, to the [facility] NEUROLOGY [facility] [place]. Please see a copy of my visit note below.    Deloris is a 58 year old who is being evaluated via a billable video visit.      How would you like to obtain your AVS? MyChart  If the video visit is dropped, the invitation should be resent by: Text to cell phone: [phone]  Will anyone else be joining your video visit? No        Video-Visit Details    Type of service:  Video Visit   Video Start Time: 8:06  Video End Time:8:16 AM    Originating Location (pt. Location): Home    Distant Location (provider location):  On-site  Platform used for Video Visit: [employer]    Visit Date: 2023      Tanisha Velarde N.P.  [facility]  [address] [place] [id number]    RE:          Deloris Chance  :       1965  MRN#:     [id number]    Dear Ms. Velarde:    I saw Deloris Chance in followup via a Telehealth visit today.  As you know, I have seen her for followup of an idiopathic sensory neuropathy.  Ms. Chance reports her condition is stable and her symptoms are adequately controlled.  Her only question today is that she has a focal cramp lasting minutes that is quite uncomfortable and occurring once every 3 to 4 weeks.  I recommended some over-the-counter options for management.     I personally reviewed her laboratory studies, which are normal, except for a presumably nonfasting elevated blood sugar.    She is comfortable continuing with me on an as-needed basis as long as you continue to write her prescriptions.  For individuals on gabapentin and duloxetine, I think it is reasonable to obtain a comprehensive metabolic panel on an annual basis.  If there are features of the metabolic syndrome in the context of sensory neuropathy, aggressive management  with statins and, if indicated, oral hypoglycemics, is appropriate.    Stanislaw Jackson MD    10 minutes spent on the date of the encounter on chart review, history and examination, documentation and further activities as noted above.    D: 2023   T: 2023   MT: VERONIQUE    Name:     ESPERANZA SINGH  MRN:      [id number]        Account:    [id number]   :      1965           Visit Date: 2023     Document: [id number]    cc:  Tanisha Velarde NP         Again, thank you for allowing me to participate in the care of your patient.        Sincerely,        Stanislaw Jackson MD

## 2023-05-27 ENCOUNTER — HEALTH MAINTENANCE LETTER (OUTPATIENT)
Age: 58
End: 2023-05-27

## 2023-08-20 ENCOUNTER — HEALTH MAINTENANCE LETTER (OUTPATIENT)
Age: 58
End: 2023-08-20

## 2024-08-04 ENCOUNTER — HEALTH MAINTENANCE LETTER (OUTPATIENT)
Age: 59
End: 2024-08-04

## 2025-02-18 ENCOUNTER — OFFICE VISIT (OUTPATIENT)
Dept: NEUROLOGY | Facility: CLINIC | Age: 60
End: 2025-02-18
Payer: MEDICARE

## 2025-02-18 VITALS
SYSTOLIC BLOOD PRESSURE: 157 MMHG | BODY MASS INDEX: 45.99 KG/M2 | HEART RATE: 69 BPM | DIASTOLIC BLOOD PRESSURE: 84 MMHG | WEIGHT: 293 LBS | HEIGHT: 67 IN

## 2025-02-18 DIAGNOSIS — G62.9 SENSORY NEUROPATHY: Primary | ICD-10-CM

## 2025-02-18 DIAGNOSIS — M79.672 LEFT FOOT PAIN: ICD-10-CM

## 2025-02-18 NOTE — PROGRESS NOTES
Return visit for 60 year old woman with sensory ganglionopathy. Symptoms were stable until November 2024, when she developed pain with weight-bearing on the left foot. In particular, she notes pain in the foot dorsum when plantarflexing, and is walking differently and using a cane to avoid that. Pain is like a broken bone (based upon prior experience). No swelling or discoloration in the feet. Not clear whether there is weakness. No injury to the foot although she does speculate that a right hip fracture and replacement may have changed her gait, resulting in left foot pain. She also feels her sensation is reduced in the sole, L more than R. She has not seen a foot specialist for this.     Denies vertigo, imbalance, chronic unexplained cough.    MSK exam:    Trace swelling over distal dorsal left foot  Point tenderness over mid-foot dorsum (MT joints?)  No discoloration  Skin intact  Pulse 2+ FROM without pain    Mental state: Alert, appropriate, speech, language, and thought content normal.      Cranial nerves II-XII: normal except for equivocal HIT.     Sensory examination:       Right Left   Light touch Normal Normal   Vibration (timed) 4 MM4   Vibration (Rydell-Seiffer)       Temp       Pin MF MF   Pos       Legend:   MM = medial malleolus, TT = tibial tuberosity, K = patella, MCP = MCP joint  MF = mid-foot, DC = distal calf, MC = mid calf, PC = proximal calf     No focal sensory deficits in the distribution of a named nerve on the left     Motor examination:       Right Left   Shoulder abduction        5 5   Elbow extension 5 5   Elbow flexion 5 5   Wrist extension         5 5   Finger extension 5 5   FDI 5 5   APB 4 4-   Hip flexion 5 5   Knee flexion 5 5   Knee extension 5 5   Dorsiflexion 5 5   Plantar flexion 5 5   A=atrophy     Tone normal     Reflexes:    Right Left   Biceps 2 2   BRD 2 2   Triceps 2 2   Patellar 2 2   Achilles 2 2   Plantar Flexor Flexor   Clonus Absent Absent       Coordination:  Finger-nose normal.  Heel-shin normal.  RRMs normal.     Gait:  Antalgic     Impression and recommendations:  Sensory neuropathy, stable.  Suspect an orthopedic etiology - she will follow up with the [facility] orthopedic clinic where she has been seen before.  RTC prrocky Jackson M.D.    24 minutes spent on the date of the encounter on chart review, history and examination, documentation and further activities as noted above.

## 2025-02-18 NOTE — NURSING NOTE
"Deloris Chance's goals for this visit include:   Chief Complaint   Patient presents with    RECHECK     Sensory neuropathy // not getting much sleep // left foot pain constant pain at 5 on pain relief // increase gabapentin       She requests these members of her care team be copied on today's visit information: yes    PCP: Tanisha Velarde    Referring Provider:  Stanislaw Jackson MD  [address] [place], [place] [id number] [address]    BP (!) 157/84 (BP Location: Right arm, Patient Position: Sitting, Cuff Size: Adult Regular)   Pulse 69   Ht 1.702 m (5' 7\")   Wt (!) 146.1 kg (322 lb)   BMI 50.43 kg/m      Do you need any medication refills at today's visit? Yes    ADALBERTO Paul, CMA ([facility])    "

## 2025-02-18 NOTE — LETTER
2/18/2025      Deloris Chance  [address] [facility] [address]      Dear Colleague,    Thank you for referring your patient, Deloris Chance, to the [facility] NEUROLOGY [facility] [place]. Please see a copy of my visit note below.    Return visit for 60 year old woman with sensory ganglionopathy. Symptoms were stable until November 2024, when she developed pain with weight-bearing on the left foot. In particular, she notes pain in the foot dorsum when plantarflexing, and is walking differently and using a cane to avoid that. Pain is like a broken bone (based upon prior experience). No swelling or discoloration in the feet. Not clear whether there is weakness. No injury to the foot although she does speculate that a right hip fracture and replacement may have changed her gait, resulting in left foot pain. She also feels her sensation is reduced in the sole, L more than R. She has not seen a foot specialist for this.     Denies vertigo, imbalance, chronic unexplained cough.    MSK exam:    Trace swelling over distal dorsal left foot  Point tenderness over mid-foot dorsum (MT joints?)  No discoloration  Skin intact  Pulse 2+ FROM without pain    Mental state: Alert, appropriate, speech, language, and thought content normal.      Cranial nerves II-XII: normal except for equivocal HIT.     Sensory examination:       Right Left   Light touch Normal Normal   Vibration (timed) 4 MM4   Vibration (Rydell-Seiffer)       Temp       Pin MF MF   Pos       Legend:   MM = medial malleolus, TT = tibial tuberosity, K = patella, MCP = MCP joint  MF = mid-foot, DC = distal calf, MC = mid calf, PC = proximal calf     No focal sensory deficits in the distribution of a named nerve on the left     Motor examination:       Right Left   Shoulder abduction        5 5   Elbow extension 5 5   Elbow flexion 5 5   Wrist extension         5 5   Finger extension 5 5   FDI 5 5   APB 4 4-   Hip flexion 5 5   Knee flexion 5 5   Knee  extension 5 5   Dorsiflexion 5 5   Plantar flexion 5 5   A=atrophy     Tone normal     Reflexes:    Right Left   Biceps 2 2   BRD 2 2   Triceps 2 2   Patellar 2 2   Achilles 2 2   Plantar Flexor Flexor   Clonus Absent Absent      Coordination:  Finger-nose normal.  Heel-shin normal.  RRMs normal.     Gait:  Antalgic     Impression and recommendations:  Sensory neuropathy, stable.  Suspect an orthopedic etiology - she will follow up with the [facility] orthopedic clinic where she has been seen before.  RTC prn    Stanislaw Jackson M.D.    24 minutes spent on the date of the encounter on chart review, history and examination, documentation and further activities as noted above.       Again, thank you for allowing me to participate in the care of your patient.        Sincerely,        Stanislaw Jackson MD    Electronically signed

## 2025-02-22 ENCOUNTER — HEALTH MAINTENANCE LETTER (OUTPATIENT)
Age: 60
End: 2025-02-22

## 2025-05-24 ENCOUNTER — HEALTH MAINTENANCE LETTER (OUTPATIENT)
Age: 60
End: 2025-05-24